# Patient Record
Sex: FEMALE | Race: BLACK OR AFRICAN AMERICAN | Employment: UNEMPLOYED | URBAN - METROPOLITAN AREA
[De-identification: names, ages, dates, MRNs, and addresses within clinical notes are randomized per-mention and may not be internally consistent; named-entity substitution may affect disease eponyms.]

---

## 2017-07-19 ENCOUNTER — OFFICE VISIT (OUTPATIENT)
Dept: FAMILY MEDICINE CLINIC | Age: 32
End: 2017-07-19

## 2017-07-19 VITALS
DIASTOLIC BLOOD PRESSURE: 82 MMHG | TEMPERATURE: 98 F | SYSTOLIC BLOOD PRESSURE: 130 MMHG | BODY MASS INDEX: 47.09 KG/M2 | HEART RATE: 90 BPM | OXYGEN SATURATION: 96 % | WEIGHT: 293 LBS | RESPIRATION RATE: 16 BRPM | HEIGHT: 66 IN

## 2017-07-19 DIAGNOSIS — Z91.81 STATUS POST FALL: ICD-10-CM

## 2017-07-19 DIAGNOSIS — M25.562 ACUTE PAIN OF BOTH KNEES: ICD-10-CM

## 2017-07-19 DIAGNOSIS — M79.672 LEFT FOOT PAIN: Primary | ICD-10-CM

## 2017-07-19 DIAGNOSIS — M25.561 ACUTE PAIN OF BOTH KNEES: ICD-10-CM

## 2017-07-19 NOTE — PROGRESS NOTES
Pt presents to office today for a fall that she had 7/8/17and injured both knees and left foot. She states that she has been using icing it, but her foot still feels bad. Went to [facility] ([place] the same day and had some Xrays, but not on her whole left foot. Chief Complaint   Patient presents with    Fall     7/8/2017. 1. Have you been to the ER, urgent care clinic since your last visit? Hospitalized since your last visit? No    2. Have you seen or consulted any other health care providers outside of the [address] [place] [address] since your last visit? Include any pap smears or colon screening. Yes to [facility] 7/8/2017.
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
## 2017-07-19 NOTE — MR AVS SNAPSHOT
Visit Information Date & Time Provider Department Dept. Phone Encounter #  
 7/19/2017  1:20 PM Rossy Tiwari, [address] [phone] [id number] Upcoming Health Maintenance Date Due DTaP/Tdap/Td series (1 - Tdap) 9/7/2004 PAP AKA CERVICAL CYTOLOGY 9/7/2004 INFLUENZA AGE 9 TO ADULT 8/1/2017 Allergies as of 7/19/2017  Review Complete On: 7/19/2017 By: Rossy Tiwari NP Severity Noted Reaction Type Reactions Cephalexin (Bulk)  03/30/2012    Unknown (comments) Current Immunizations  Never Reviewed No immunizations on file. Not reviewed this visit You Were Diagnosed With   
  
 Codes Comments Left foot pain    -  Primary ICD-10-CM: T79.435 ICD-9-CM: 729.5 Acute pain of both knees     ICD-10-CM: M25.561, M25.562 ICD-9-CM: 338.19, 719.46 Status post fall     ICD-10-CM: Z91.81 
ICD-9-CM: V15.88 Vitals BP Pulse Temp Resp Height(growth percentile) Weight(growth percentile) 130/82 90 98 °F (36.7 °C) (Oral) 16 5' 6\" (1.676 m) 319 lb (144.7 kg) LMP SpO2 BMI OB Status Smoking Status 07/08/2017 96% 51.49 kg/m2 Needs review Never Smoker Vitals History BMI and BSA Data Body Mass Index Body Surface Area  
 51.49 kg/m 2 2.6 m 2 Preferred Pharmacy [facility] Phone [facility] [address] [place] [address] [phone] Your Updated Medication List  
  
Notice  As of 7/19/2017  1:59 PM  
 You have not been prescribed any medications. To-Do List   
 10/03/2017 Imaging:  XR FOOT LT AP/LAT Introducing [facility]! Dear Yanira Carrion: 
Thank you for requesting a [email or web address] account. Our records indicate that you already have an active [email or web address] account. You can access your account anytime at [email or web address] Did you know that you can access your hospital and ER discharge instructions at any time in [employer]? You can also review all of your test results from your hospital stay or ER visit. Additional Information If you have questions, please visit the Frequently Asked Questions section of the [employer] website at [email or web address] Remember, [employer] is NOT to be used for urgent needs. For medical emergencies, dial 911. Now available from your iPhone and Android! Please provide this summary of care documentation to your next provider. Your primary care clinician is listed as Jayshree Case. If you have any questions after today's visit, please call [phone].

## 2018-04-13 ENCOUNTER — OFFICE VISIT (OUTPATIENT)
Dept: FAMILY MEDICINE CLINIC | Age: 33
End: 2018-04-13

## 2018-04-13 VITALS
OXYGEN SATURATION: 98 % | SYSTOLIC BLOOD PRESSURE: 161 MMHG | HEART RATE: 89 BPM | RESPIRATION RATE: 20 BRPM | BODY MASS INDEX: 47.09 KG/M2 | DIASTOLIC BLOOD PRESSURE: 96 MMHG | HEIGHT: 66 IN | WEIGHT: 293 LBS | TEMPERATURE: 98 F

## 2018-04-13 DIAGNOSIS — F32.1 MODERATE MAJOR DEPRESSION (HCC): Primary | ICD-10-CM

## 2018-04-13 DIAGNOSIS — Z13.220 SCREENING, LIPID: ICD-10-CM

## 2018-04-13 DIAGNOSIS — R03.0 ELEVATED BLOOD PRESSURE, SITUATIONAL: ICD-10-CM

## 2018-04-13 DIAGNOSIS — E28.2 PCOS (POLYCYSTIC OVARIAN SYNDROME): ICD-10-CM

## 2018-04-13 DIAGNOSIS — L68.0 HIRSUTISM: ICD-10-CM

## 2018-04-13 PROBLEM — E66.01 OBESITY, MORBID (HCC): Status: ACTIVE | Noted: 2018-04-13

## 2018-04-13 RX ORDER — NAPROXEN SODIUM 220 MG
220 TABLET ORAL 2 TIMES DAILY WITH MEALS
COMMUNITY
End: 2018-07-31

## 2018-04-13 RX ORDER — SPIRONOLACTONE 25 MG/1
25 TABLET ORAL DAILY
Qty: 30 TAB | Refills: 1 | Status: SHIPPED | OUTPATIENT
Start: 2018-04-13 | End: 2018-08-04 | Stop reason: SDUPTHER

## 2018-04-13 NOTE — PROGRESS NOTES
Chief Complaint   Patient presents with    Complete Physical     patient is here today for a complete physical, also c/o headache x 2 days     1. Have you been to the ER, urgent care clinic since your last visit? Hospitalized since your last visit? Yes When: [facility] February 2018 dx: idalia    2. Have you seen or consulted any other health care providers outside of the [address] [place] [address] since your last visit? Include any pap smears or colon screening.  No

## 2018-04-13 NOTE — PATIENT INSTRUCTIONS
Recovering From Depression: Care Instructions  Your Care Instructions    Taking good care of yourself is important as you recover from depression. In time, your symptoms will fade as your treatment takes hold. Do not give up. Instead, focus your energy on getting better. Your mood will improve. It just takes some time. Focus on things that can help you feel better, such as being with friends and family, eating well, and getting enough rest. But take things slowly. Do not do too much too soon. You will begin to feel better gradually. Follow-up care is a key part of your treatment and safety. Be sure to make and go to all appointments, and call your doctor if you are having problems. It's also a good idea to know your test results and keep a list of the medicines you take. How can you care for yourself at home? Be realistic  · If you have a large task to do, break it up into smaller steps you can handle, and just do what you can. · You may want to put off important decisions until your depression has lifted. If you have plans that will have a major impact on your life, such as marriage, divorce, or a job change, try to wait a bit. Talk it over with friends and loved ones who can help you look at the overall picture first.  · Reaching out to people for help is important. Do not isolate yourself. Let your family and friends help you. Find someone you can trust and confide in, and talk to that person. · Be patient, and be kind to yourself. Remember that depression is not your fault and is not something you can overcome with willpower alone. Treatment is necessary for depression, just like for any other illness. Feeling better takes time, and your mood will improve little by little. Stay active  · Stay busy and get outside. Take a walk, or try some other light exercise. · Talk with your doctor about an exercise program. Exercise can help with mild depression. · Go to a movie or concert.  Take part in a [employer] activity or other social gathering. Go to a Nodeable game. · Ask a friend to have dinner with you. Take care of yourself  · Eat a balanced diet with plenty of fresh fruits and vegetables, whole grains, and lean protein. If you have lost your appetite, eat small snacks rather than large meals. · Avoid drinking alcohol or using illegal drugs. Do not take medicines that have not been prescribed for you. They may interfere with medicines you may be taking for depression, or they may make your depression worse. · Take your medicines exactly as they are prescribed. You may start to feel better within 1 to 3 weeks of taking antidepressant medicine. But it can take as many as 6 to 8 weeks to see more improvement. If you have questions or concerns about your medicines, or if you do not notice any improvement by 3 weeks, talk to your doctor. · If you have any side effects from your medicine, tell your doctor. Antidepressants can make you feel tired, dizzy, or nervous. Some people have dry mouth, constipation, headaches, sexual problems, or diarrhea. Many of these side effects are mild and will go away on their own after you have been taking the medicine for a few weeks. Some may last longer. Talk to your doctor if side effects are bothering you too much. You might be able to try a different medicine. · Get enough sleep. If you have problems sleeping:  ¨ Go to bed at the same time every night, and get up at the same time every morning. ¨ Keep your bedroom dark and quiet. ¨ Do not exercise after 5:00 p.m. ¨ Avoid drinks with caffeine after 5:00 p.m. · Avoid sleeping pills unless they are prescribed by the doctor treating your depression. Sleeping pills may make you groggy during the day, and they may interact with other medicine you are taking. · If you have any other illnesses, such as diabetes, heart disease, or high blood pressure, make sure to continue with your treatment.  Tell your doctor about all of the medicines you take, including those with or without a prescription. · Keep the numbers for these national suicide hotlines: 5-636-201-TALK (9-805.661.4254) and 4-164-LUYONXX (2-852.572.3838). If you or someone you know talks about suicide or feeling hopeless, get help right away. When should you call for help? Call 911 anytime you think you may need emergency care. For example, call if:  ? · You feel like hurting yourself or someone else. ? · Someone you know has depression and is about to attempt or is attempting suicide. ?Call your doctor now or seek immediate medical care if:  ? · You hear voices. ? · Someone you know has depression and:  ¨ Starts to give away his or her possessions. ¨ Uses illegal drugs or drinks alcohol heavily. ¨ Talks or writes about death, including writing suicide notes or talking about guns, knives, or pills. ¨ Starts to spend a lot of time alone. ¨ Acts very aggressively or suddenly appears calm. ? Watch closely for changes in your health, and be sure to contact your doctor if:  ? · You do not get better as expected. Where can you learn more? Go to http://kerry-faustino.info/. Enter L105 in the search box to learn more about \"Recovering From Depression: Care Instructions. \"  Current as of: May 12, 2017  Content Version: 11.4  © 2544-7794 Healthwise, Incorporated. Care instructions adapted under license by Gre[employer] (which disclaims liability or warranty for this information). If you have questions about a medical condition or this instruction, always ask your healthcare professional. [address] any warranty or liability for your use of this information.

## 2018-04-13 NOTE — MR AVS SNAPSHOT
[address] Melissa Combs S[address] 
[phone] Patient: Erika Velasquez MRN: [id number] WRJ:[birth year] Visit Information Date & Time Provider Department Dept. Phone Encounter #  
 4/13/2018  3:15 PM Maritza Rodríguez  [facility] [phone] [id number] Follow-up Instructions Return in about 4 weeks (around 5/11/2018) for Complete Physical.  
  
Upcoming Health Maintenance Date Due DTaP/Tdap/Td series (1 - Tdap) 9/7/2004 PAP AKA CERVICAL CYTOLOGY 9/7/2004 Influenza Age 5 to Adult 8/1/2017 Allergies as of 4/13/2018  Review Complete On: 4/13/2018 By: Maritza Rodríguez NP Severity Noted Reaction Type Reactions Cephalexin (Bulk)  03/30/2012    Unknown (comments) Current Immunizations  Never Reviewed No immunizations on file. Not reviewed this visit You Were Diagnosed With   
  
 Codes Comments Moderate major depression ([facility]    -  Primary ICD-10-CM: F32.1 ICD-9-CM: 296.22 Hirsutism     ICD-10-CM: L68.0 ICD-9-CM: 704.1 PCOS (polycystic ovarian syndrome)     ICD-10-CM: E28.2 ICD-9-CM: 256.4 Screening, lipid     ICD-10-CM: O95.410 ICD-9-CM: V77.91 Elevated blood pressure, situational     ICD-10-CM: R03.0 ICD-9-CM: 796.2 Vitals BP Pulse Temp Resp Height(growth percentile) Weight(growth percentile) (!) 161/96 (BP 1 Location: Right arm, BP Patient Position: Sitting) 89 98 °F (36.7 °C) (Oral) 20 5' 6\" (1.676 m) 324 lb 9.6 oz (147.2 kg) SpO2 BMI OB Status Smoking Status 98% 52.39 kg/m2 Polycystic Ovarian Syndrome Never Smoker Vitals History BMI and BSA Data Body Mass Index Body Surface Area  
 52.39 kg/m 2 2.62 m 2 Preferred Pharmacy [facility] Phone [facility] [address] [place] [address] [phone] Your Updated Medication List  
  
   
 This list is accurate as of 4/13/18  4:33 PM.  Always use your most recent med list.  
  
  
  
  
 ALEVE 220 mg tablet Generic drug:  naproxen sodium Take 220 mg by mouth two (2) times daily (with meals). spironolactone 25 mg tablet Commonly known as:  ALDACTONE Take 1 Tab by mouth daily. Prescriptions Sent to Pharmacy Refills  
 spironolactone (ALDACTONE) 25 mg tablet 1 Sig: Take 1 Tab by mouth daily. Class: Normal  
 Pharmacy: [address] [place] [address] #: [phone] Route: Oral  
  
We Performed the Following CBC WITH AUTOMATED DIFF [19931 CPT(R)] HEMOGLOBIN A1C WITH EAG [45616 CPT(R)] LIPID PANEL [01854 CPT(R)] METABOLIC PANEL, COMPREHENSIVE [68141 CPT(R)] Follow-up Instructions Return in about 4 weeks (around 5/11/2018) for Complete Physical.  
  
  
Patient Instructions Recovering From Depression: Care Instructions Your Care Instructions Taking good care of yourself is important as you recover from depression. In time, your symptoms will fade as your treatment takes hold. Do not give up. Instead, focus your energy on getting better. Your mood will improve. It just takes some time. Focus on things that can help you feel better, such as being with friends and family, eating well, and getting enough rest. But take things slowly. Do not do too much too soon. You will begin to feel better gradually. Follow-up care is a key part of your treatment and safety. Be sure to make and go to all appointments, and call your doctor if you are having problems. It's also a good idea to know your test results and keep a list of the medicines you take. How can you care for yourself at home? Be realistic · If you have a large task to do, break it up into smaller steps you can handle, and just do what you can.  
· You may want to put off important decisions until your depression has lifted. If you have plans that will have a major impact on your life, such as marriage, divorce, or a job change, try to wait a bit. Talk it over with friends and loved ones who can help you look at the overall picture first. 
· Reaching out to people for help is important. Do not isolate yourself. Let your family and friends help you. Find someone you can trust and confide in, and talk to that person. · Be patient, and be kind to yourself. Remember that depression is not your fault and is not something you can overcome with willpower alone. Treatment is necessary for depression, just like for any other illness. Feeling better takes time, and your mood will improve little by little. Stay active · Stay busy and get outside. Take a walk, or try some other light exercise. · Talk with your doctor about an exercise program. Exercise can help with mild depression. · Go to a movie or concert. Take part in a [employer] activity or other social gathering. Go to a ball game. · Ask a friend to have dinner with you. Take care of yourself · Eat a balanced diet with plenty of fresh fruits and vegetables, whole grains, and lean protein. If you have lost your appetite, eat small snacks rather than large meals. · Avoid drinking alcohol or using illegal drugs. Do not take medicines that have not been prescribed for you. They may interfere with medicines you may be taking for depression, or they may make your depression worse. · Take your medicines exactly as they are prescribed. You may start to feel better within 1 to 3 weeks of taking antidepressant medicine. But it can take as many as 6 to 8 weeks to see more improvement. If you have questions or concerns about your medicines, or if you do not notice any improvement by 3 weeks, talk to your doctor. · If you have any side effects from your medicine, tell your doctor. Antidepressants can make you feel tired, dizzy, or nervous.  Some people have dry mouth, constipation, headaches, sexual problems, or diarrhea. Many of these side effects are mild and will go away on their own after you have been taking the medicine for a few weeks. Some may last longer. Talk to your doctor if side effects are bothering you too much. You might be able to try a different medicine. · Get enough sleep. If you have problems sleeping: ¨ Go to bed at the same time every night, and get up at the same time every morning. ¨ Keep your bedroom dark and quiet. ¨ Do not exercise after 5:00 p.m. ¨ Avoid drinks with caffeine after 5:00 p.m. · Avoid sleeping pills unless they are prescribed by the doctor treating your depression. Sleeping pills may make you groggy during the day, and they may interact with other medicine you are taking. · If you have any other illnesses, such as diabetes, heart disease, or high blood pressure, make sure to continue with your treatment. Tell your doctor about all of the medicines you take, including those with or without a prescription. · Keep the numbers for these national suicide hotlines: 6-032-607-TALK (4-490.505.6479) and 8-947-FLGJQIQ (5-253.442.8234). If you or someone you know talks about suicide or feeling hopeless, get help right away. When should you call for help? Call 911 anytime you think you may need emergency care. For example, call if: 
? · You feel like hurting yourself or someone else. ? · Someone you know has depression and is about to attempt or is attempting suicide. ?Call your doctor now or seek immediate medical care if: 
? · You hear voices. ? · Someone you know has depression and: 
¨ Starts to give away his or her possessions. ¨ Uses illegal drugs or drinks alcohol heavily. ¨ Talks or writes about death, including writing suicide notes or talking about guns, knives, or pills. ¨ Starts to spend a lot of time alone. ¨ Acts very aggressively or suddenly appears calm. ?Watch closely for changes in your health, and be sure to contact your doctor if: 
? · You do not get better as expected. Where can you learn more? Go to http://kerry-faustino.info/. Enter Z695 in the search box to learn more about \"Recovering From Depression: Care Instructions. \" Current as of: May 12, 2017 Content Version: 11.4 © 4129-9512 Fly[employer]. Care instructions adapted under license by [employer]Serv (which disclaims liability or warranty for this information). If you have questions about a medical condition or this instruction, always ask your healthcare professional. [address] any warranty or liability for your use of this information. Introducing hospitals [facility]! Dear Vilma Reyna: 
Thank you for requesting a [employer] account. Our records indicate that you already have an active [employer] account. You can access your account anytime at [email or web address] Did you know that you can access your hospital and ER discharge instructions at any time in [employer]? You can also review all of your test results from your hospital stay or ER visit. Additional Information If you have questions, please visit the Frequently Asked Questions section of the [employer] website at [email or web address] Remember, [employer] is NOT to be used for urgent needs. For medical emergencies, dial 911. Now available from your iPhone and Android! Please provide this summary of care documentation to your next provider. Your primary care clinician is listed as Misael Ward. If you have any questions after today's visit, please call [phone].

## 2018-04-14 LAB
ALBUMIN SERPL-MCNC: 4.1 G/DL (ref 3.5–5.5)
ALBUMIN/GLOB SERPL: 1 {RATIO} (ref 1.2–2.2)
ALP SERPL-CCNC: 172 IU/L (ref 39–117)
ALT SERPL-CCNC: 12 IU/L (ref 0–32)
AST SERPL-CCNC: 10 IU/L (ref 0–40)
BASOPHILS # BLD AUTO: 0 X10E3/UL (ref 0–0.2)
BASOPHILS NFR BLD AUTO: 0 %
BILIRUB SERPL-MCNC: 0.5 MG/DL (ref 0–1.2)
BUN SERPL-MCNC: 11 MG/DL (ref 6–20)
BUN/CREAT SERPL: 15 (ref 9–23)
CALCIUM SERPL-MCNC: 9.6 MG/DL (ref 8.7–10.2)
CHLORIDE SERPL-SCNC: 100 MMOL/L (ref 96–106)
CHOLEST SERPL-MCNC: 143 MG/DL (ref 100–199)
CO2 SERPL-SCNC: 26 MMOL/L (ref 18–29)
CREAT SERPL-MCNC: 0.71 MG/DL (ref 0.57–1)
EOSINOPHIL # BLD AUTO: 0 X10E3/UL (ref 0–0.4)
EOSINOPHIL NFR BLD AUTO: 1 %
ERYTHROCYTE [DISTWIDTH] IN BLOOD BY AUTOMATED COUNT: 19 % (ref 12.3–15.4)
EST. AVERAGE GLUCOSE BLD GHB EST-MCNC: 151 MG/DL
GFR SERPLBLD CREATININE-BSD FMLA CKD-EPI: 111 ML/MIN/1.73
GFR SERPLBLD CREATININE-BSD FMLA CKD-EPI: 129 ML/MIN/1.73
GLOBULIN SER CALC-MCNC: 4.3 G/DL (ref 1.5–4.5)
GLUCOSE SERPL-MCNC: 116 MG/DL (ref 65–99)
HBA1C MFR BLD: 6.9 % (ref 4.8–5.6)
HCT VFR BLD AUTO: 36 % (ref 34–46.6)
HDLC SERPL-MCNC: 67 MG/DL
HGB BLD-MCNC: 11.2 G/DL (ref 11.1–15.9)
IMM GRANULOCYTES # BLD: 0 X10E3/UL (ref 0–0.1)
IMM GRANULOCYTES NFR BLD: 0 %
INTERPRETATION, 910389: NORMAL
LDLC SERPL CALC-MCNC: 57 MG/DL (ref 0–99)
LYMPHOCYTES # BLD AUTO: 2.1 X10E3/UL (ref 0.7–3.1)
LYMPHOCYTES NFR BLD AUTO: 36 %
MCH RBC QN AUTO: 21.8 PG (ref 26.6–33)
MCHC RBC AUTO-ENTMCNC: 31.1 G/DL (ref 31.5–35.7)
MCV RBC AUTO: 70 FL (ref 79–97)
MONOCYTES # BLD AUTO: 0.3 X10E3/UL (ref 0.1–0.9)
MONOCYTES NFR BLD AUTO: 6 %
NEUTROPHILS # BLD AUTO: 3.3 X10E3/UL (ref 1.4–7)
NEUTROPHILS NFR BLD AUTO: 57 %
PLATELET # BLD AUTO: 332 X10E3/UL (ref 150–379)
POTASSIUM SERPL-SCNC: 4.5 MMOL/L (ref 3.5–5.2)
PROT SERPL-MCNC: 8.4 G/DL (ref 6–8.5)
RBC # BLD AUTO: 5.13 X10E6/UL (ref 3.77–5.28)
SODIUM SERPL-SCNC: 138 MMOL/L (ref 134–144)
TRIGL SERPL-MCNC: 93 MG/DL (ref 0–149)
VLDLC SERPL CALC-MCNC: 19 MG/DL (ref 5–40)
WBC # BLD AUTO: 5.8 X10E3/UL (ref 3.4–10.8)

## 2018-04-16 NOTE — PROGRESS NOTES
Need to see her to discuss labs on a Friday. She has new diagnosis of Diabetes from her most current labs.

## 2018-04-18 ENCOUNTER — OFFICE VISIT (OUTPATIENT)
Dept: FAMILY MEDICINE CLINIC | Age: 33
End: 2018-04-18

## 2018-04-18 VITALS
BODY MASS INDEX: 47.09 KG/M2 | OXYGEN SATURATION: 99 % | TEMPERATURE: 98.2 F | RESPIRATION RATE: 18 BRPM | HEIGHT: 66 IN | DIASTOLIC BLOOD PRESSURE: 106 MMHG | WEIGHT: 293 LBS | SYSTOLIC BLOOD PRESSURE: 147 MMHG | HEART RATE: 91 BPM

## 2018-04-18 DIAGNOSIS — E11.9 CONTROLLED TYPE 2 DIABETES MELLITUS WITHOUT COMPLICATION, WITHOUT LONG-TERM CURRENT USE OF INSULIN (HCC): ICD-10-CM

## 2018-04-18 DIAGNOSIS — I10 ESSENTIAL HYPERTENSION: Primary | ICD-10-CM

## 2018-04-18 DIAGNOSIS — E66.01 OBESITY, MORBID (HCC): ICD-10-CM

## 2018-04-18 RX ORDER — METFORMIN HYDROCHLORIDE 500 MG/1
500 TABLET, EXTENDED RELEASE ORAL
Qty: 30 TAB | Refills: 1 | Status: SHIPPED | OUTPATIENT
Start: 2018-04-18 | End: 2018-05-11 | Stop reason: SDUPTHER

## 2018-04-18 RX ORDER — HYDROCHLOROTHIAZIDE 25 MG/1
25 TABLET ORAL DAILY
Qty: 30 TAB | Refills: 1 | Status: SHIPPED | OUTPATIENT
Start: 2018-04-18 | End: 2018-08-04 | Stop reason: SDUPTHER

## 2018-04-18 NOTE — MR AVS SNAPSHOT
[address] [facility] [address] 
 
 
 [address] 
[phone] Patient: Beatriz Seay MRN: [id number] KFS:[id number] Visit Information Date & Time Provider Department Dept. Phone Encounter #  
 4/18/2018  1:45 PM Maryann Hollins  [facility] [phone] [id number] Follow-up Instructions Return in about 4 weeks (around 5/16/2018) for Follow Up. Your Appointments 5/11/2018  9:15 AM  
ROUTINE CARE with Maryann Hollins  [facility] ([facility]) Appt Note: f/u  
 [address] [id number]  
[phone]  
  
   
 [address] [address] Upcoming Health Maintenance Date Due DTaP/Tdap/Td series (1 - Tdap) 9/7/2004 PAP AKA CERVICAL CYTOLOGY 9/7/2004 Influenza Age 5 to Adult 9/1/2018* *Topic was postponed. The date shown is not the original due date. Allergies as of 4/18/2018  Review Complete On: 4/18/2018 By: Linda Frazier Severity Noted Reaction Type Reactions Cephalexin (Bulk)  03/30/2012    Unknown (comments) Current Immunizations  Never Reviewed No immunizations on file. Not reviewed this visit You Were Diagnosed With   
  
 Codes Comments Essential hypertension    -  Primary ICD-10-CM: I10 
ICD-9-CM: 401.9 Controlled type 2 diabetes mellitus without complication, without long-term current use of insulin ([facility]     ICD-10-CM: E11.9 ICD-9-CM: 250.00 Vitals BP Pulse Temp Resp Height(growth percentile) Weight(growth percentile) (!) 147/106 (BP 1 Location: Left arm, BP Patient Position: Sitting) 91 98.2 °F (36.8 °C) (Oral) 18 5' 6\" (1.676 m) 325 lb (147.4 kg) SpO2 BMI OB Status Smoking Status 99% 52.46 kg/m2 Polycystic Ovarian Syndrome Never Smoker BMI and BSA Data Body Mass Index Body Surface Area  
 52.46 kg/m 2 2.62 m 2 Preferred Pharmacy Pharmacy Name Phone [facility] [address] - Kaitlyn Baker, [address] [facility] [phone] Your Updated Medication List  
  
   
This list is accurate as of 4/18/18  2:56 PM.  Always use your most recent med list.  
  
  
  
  
 ALEVE 220 mg tablet Generic drug:  naproxen sodium Take 220 mg by mouth two (2) times daily (with meals). hydroCHLOROthiazide 25 mg tablet Commonly known as:  HYDRODIURIL Take 1 Tab by mouth daily. metFORMIN  mg tablet Commonly known as:  GLUCOPHAGE XR Take 1 Tab by mouth daily (with dinner). spironolactone 25 mg tablet Commonly known as:  ALDACTONE Take 1 Tab by mouth daily. Prescriptions Sent to Pharmacy Refills  
 metFORMIN ER (GLUCOPHAGE XR) 500 mg tablet 1 Sig: Take 1 Tab by mouth daily (with dinner). Class: Normal  
 Pharmacy: [address] [place] [address] Ph #: [phone] Route: Oral  
 hydroCHLOROthiazide (HYDRODIURIL) 25 mg tablet 1 Sig: Take 1 Tab by mouth daily. Class: Normal  
 Pharmacy: [address] [place] [address] Ph #: [phone] Route: Oral  
  
Follow-up Instructions Return in about 4 weeks (around 5/16/2018) for Follow Up. Patient Instructions Learning About Diabetes Food Guidelines Your Care Instructions Meal planning is important to manage diabetes. It helps keep your blood sugar at a target level (which you set with your doctor). You don't have to eat special foods. You can eat what your family eats, including sweets once in a while. But you do have to pay attention to how often you eat and how much you eat of certain foods. You may want to work with a dietitian or a certified diabetes educator (CDE) to help you plan meals and snacks. A dietitian or CDE can also help you lose weight if that is one of your goals. What should you know about eating carbs? Managing the amount of carbohydrate (carbs) you eat is an important part of healthy meals when you have diabetes. Carbohydrate is found in many foods. · Learn which foods have carbs. And learn the amounts of carbs in different foods. ¨ Bread, cereal, pasta, and rice have about 15 grams of carbs in a serving. A serving is 1 slice of bread (1 ounce), ½ cup of cooked cereal, or 1/3 cup of cooked pasta or rice. ¨ Fruits have 15 grams of carbs in a serving. A serving is 1 small fresh fruit, such as an apple or orange; ½ of a banana; ½ cup of cooked or canned fruit; ½ cup of fruit juice; 1 cup of melon or raspberries; or 2 tablespoons of dried fruit. ¨ Milk and no-sugar-added yogurt have 15 grams of carbs in a serving. A serving is 1 cup of milk or 2/3 cup of no-sugar-added yogurt. ¨ Starchy vegetables have 15 grams of carbs in a serving. A serving is ½ cup of mashed potatoes or sweet potato; 1 cup winter squash; ½ of a small baked potato; ½ cup of cooked beans; or ½ cup cooked corn or green peas. · Learn how much carbs to eat each day and at each meal. A dietitian or CDE can teach you how to keep track of the amount of carbs you eat. This is called carbohydrate counting. · If you are not sure how to count carbohydrate grams, use the Plate Method to plan meals. It is a good, quick way to make sure that you have a balanced meal. It also helps you spread carbs throughout the day. ¨ Divide your plate by types of foods. Put non-starchy vegetables on half the plate, meat or other protein food on one-quarter of the plate, and a grain or starchy vegetable in the final quarter of the plate. To this you can add a small piece of fruit and 1 cup of milk or yogurt, depending on how many carbs you are supposed to eat at a meal. 
· Try to eat about the same amount of carbs at each meal. Do not \"save up\" your daily allowance of carbs to eat at one meal. 
· Proteins have very little or no carbs per serving.  Examples of proteins are beef, chicken, turkey, fish, eggs, tofu, cheese, cottage cheese, and peanut butter. A serving size of meat is 3 ounces, which is about the size of a deck of cards. Examples of meat substitute serving sizes (equal to 1 ounce of meat) are 1/4 cup of cottage cheese, 1 egg, 1 tablespoon of peanut butter, and ½ cup of tofu. How can you eat out and still eat healthy? · Learn to estimate the serving sizes of foods that have carbohydrate. If you measure food at home, it will be easier to estimate the amount in a serving of restaurant food. · If the meal you order has too much carbohydrate (such as potatoes, corn, or baked beans), ask to have a low-carbohydrate food instead. Ask for a salad or green vegetables. · If you use insulin, check your blood sugar before and after eating out to help you plan how much to eat in the future. · If you eat more carbohydrate at a meal than you had planned, take a walk or do other exercise. This will help lower your blood sugar. What else should you know? · Limit saturated fat, such as the fat from meat and dairy products. This is a healthy choice because people who have diabetes are at higher risk of heart disease. So choose lean cuts of meat and nonfat or low-fat dairy products. Use olive or canola oil instead of butter or shortening when cooking. · Don't skip meals. Your blood sugar may drop too low if you skip meals and take insulin or certain medicines for diabetes. · Check with your doctor before you drink alcohol. Alcohol can cause your blood sugar to drop too low. Alcohol can also cause a bad reaction if you take certain diabetes medicines. Follow-up care is a key part of your treatment and safety. Be sure to make and go to all appointments, and call your doctor if you are having problems. It's also a good idea to know your test results and keep a list of the medicines you take. Where can you learn more? Go to http://kerry-faustino.info/. Enter R110 in the search box to learn more about \"Learning About Diabetes Food Guidelines. \" Current as of: March 13, 2017 Content Version: 11.4 © 6585-4733 Healthwise, [employer]. Care instructions adapted under license by [employer] (which disclaims liability or warranty for this information). If you have questions about a medical condition or this instruction, always ask your healthcare professional. [address] any warranty or liability for your use of this information. Introducing [facility]! Dear Jame Nageotte: 
Thank you for requesting a [employer] account. Our records indicate that you already have an active [employer] account. You can access your account anytime at [email or web address] Did you know that you can access your hospital and ER discharge instructions at any time in [employer]? You can also review all of your test results from your hospital stay or ER visit. Additional Information If you have questions, please visit the Frequently Asked Questions section of the [employer] website at [email or web address] Remember, [employer] is NOT to be used for urgent needs. For medical emergencies, dial 911. Now available from your iPhone and Android! Please provide this summary of care documentation to your next provider. Your primary care clinician is listed as Barbara Diaz. If you have any questions after today's visit, please call [phone].

## 2018-04-18 NOTE — PATIENT INSTRUCTIONS

## 2018-04-18 NOTE — PROGRESS NOTES
Cc  1. Have you been to the ER, urgent care clinic since your last visit? Hospitalized since your last visit? No    2. Have you seen or consulted any other health care providers outside of the [address] [place] [address] since your last visit? Include any pap smears or colon screening.  No

## 2018-04-20 ENCOUNTER — PATIENT OUTREACH (OUTPATIENT)
Dept: FAMILY MEDICINE CLINIC | Age: 33
End: 2018-04-20

## 2018-04-20 NOTE — PROGRESS NOTES
NN Adherence Note:  By Yoana Beckwith RN    Patient into see NN today for follow up referral of newly diagnosed T2 diabetes, meal planning and instructions on skills necessary to manage disease process. Patient verified by , name and address.     Obedsharif Franny reviewed the patient's understanding of her diagnosis and her skills on using blood sugar monitoring equipment. The patient was able to adequately demonstrate the procedure of using a FreeStyle Lite  Meter after demonstrated by NN. Blood glucose 123mg/dl    Patient given a Getting Started Kit with a guide on site rotation, for finger sticks and signs and symptoms of hypo and hyperglycemia. Patient was able to preform her glucose testing. Patient is hesitant with checking her blood glucose daily. She voiced her fear on finger stick. NN advised checking her blood sugar and recording the results are very important in knowing if therapy is working. Patient has agreed to check her blood glucose every morning before breakfast and record results. NN instructed patient on her numbers and where her numbers should be. Patient has a Freestyle Lite Glucose Meter and instructed on it's use. Patient verbalized understand of meter's use and it's operation process. NN set date/time. NN gave patient a log and instructed her on how to log her results in it, and to bring it with her at her next office visit scheduled 18. Patient instructed on meal planning and the importance of making long term changes by eating regular meals, not skipping meals, eating around the same time each day, and controlling her carbohydrates. NN instructed patient on foods that raise her blood sugar like starches, natural sugars, and added sugars. Patient instructed to eat more fiber and less saturated fats. Patient encouraged to drink water and to incorporate and exercise program into her daily activity.  Patient agreed to drink at least one 12oz cup of water daily and to have one part water and one part lemonade. Patient given several meal planning guides and a daily dairy to record results, multiple carbohydrate guides, and was instructed on portion sizes and dividing carbohydrates. NN assisted patient with selecting foods to create a meal plan and encouraged patient to do this on Sunday night and plan it for the entire week. Patient instructed on how to divide carbohydrates into meals and snacks to total 45-60 grams of carbohydrates at each meal, and how to read labels to be able to select the total carbohydrates in each meal choice. The following plan of care was discussed: Focused assessment Type 2 diabetes    Interventions for Diabetes Goals    Understands Diabetes Action Plan  Educate patient on when to seek medical care (red flags)  Educate patient on what to do with high and low blood sugar readings  Educate patient on how to manage sick days  Educate need for update to immunizations  Educate need for regular dental exams  Instruct patient on how to access supportive resources. Instruct on smoking cessation  Instruct on the need to include daily exercise in routine and how it effects blood sugar    Long Term Goal: To maintain and/or decrease biometrics to A1C </or= to 7, /80 or less, LDL less than 100 or less than 70 in patients with CAD. SRO 1: Patient will monitor biometrics and report decreased or elevated ranges to health care team.    Educate how to use glucometer and record readings  Educate on how to home monitor blood pressure  Educate need for regular follow-up and fasting labs  Educate how to care for feet and eyes    SRO 2: Patient will understand effects of food/beverages on blood glucose and how it effects the organs in the body. Evaluate food diary on daily basis. Educate on proper nutrient management for diabetes.   Educate on portion control  Educate on how to read a food label    Assist patient to develop a personalized meal plan  Educate proper food selection when eating out  Educate of how alcohol effects blood glucose     SRO 3: Patient will understand medication management and importance of adherence. Educate patient on actions, side effects, missed dose, skipping meals and delivery methods    SRO 4: Patient will demonstration a reduced risk of complications related to diabetes  Educate on risk of developing renal disease, heart disease, peripheral vascular disease, and retinopathy     SRO 5: Patient will verbalize acceptance of diagnose and participate in plan for management. Screen for depression  Educate on importance of support system  Adapting behaviors in social situations. Review plan of care with patient. Patient has provided input to plan and agrees with goals. NN will follow up with patient within one-two weeks.

## 2018-04-21 NOTE — PROGRESS NOTES
HPI:   Aubree Lauren is a 27 y.o. female who presents to [facility]. She reports recent feelings of sadness which she would like to discuss today. Depression  Patient complains of depression. She complains of depressed mood, hypersomnia, fatigue, feelings of worthlessness/guilt and hopelessness. Onset was approximately 6 months ago, gradually worsening since that time. She denies current suicidal and homicidal plan or intent. Family history significant for nothing. Possible organic causes contributing are: none. Risk factors: negative life event current housing facility is attempting to remove her pets (2 dogs) from her apartment. She is not allowed to have pets in her current facility. The dogs were given to her by her uncle who is . Previous treatment includes no medications and no other ther   Apies. On disability since a young adult for \"learning disorder\"  Completed part of her associates degree as she previously wanted to work with children. Is accompanied with her mother today. Does not have a friend base. Spends her time with her dogs in the apartment. Reports a poor diet. Meals are typically once daily, some type of meat protein. Fluids are sweet tea or lemonade. No current exercise. Reports a history of hirsutism and PCOS. Declines prior treatment. No recent follow up. Followed ob ob/gyn. Sexually active with a male partner and would like to have children in the future. Not currently using contraceptives. Current Outpatient Prescriptions   Medication Sig Dispense Refill    naproxen sodium (ALEVE) 220 mg tablet Take 220 mg by mouth two (2) times daily (with meals).  spironolactone (ALDACTONE) 25 mg tablet Take 1 Tab by mouth daily. 30 Tab 1    metFORMIN ER (GLUCOPHAGE XR) 500 mg tablet Take 1 Tab by mouth daily (with dinner). 30 Tab 1    hydroCHLOROthiazide (HYDRODIURIL) 25 mg tablet Take 1 Tab by mouth daily.  30 Tab 1      Allergies   Allergen Reactions  Cephalexin (Bulk) Unknown (comments)      Patient Active Problem List   Diagnosis Code    Iron deficiency anemia, unspecified D50.9    Obesity, unspecified E66.9    PCOS (polycystic ovarian syndrome) E28.2    Fatigue R53.83    Hypovitaminosis D E55.9    Scalp mass R22.0    Obesity, morbid (HCC) E66.01    Essential hypertension I10    Controlled type 2 diabetes mellitus without complication, without long-term current use of insulin (HCC) E11.9     Past Medical History:   Diagnosis Date    Head injury     Head injury with laceration at age 7       No past surgical history on file. No LMP recorded. Patient is not currently having periods (Reason: Polycystic Ovarian Syndrome). Family History   Problem Relation Age of Onset    Diabetes Maternal Grandmother     Diabetes Maternal Uncle       Social History     Social History    Marital status: SINGLE     Spouse name: N/A    Number of children: N/A    Years of education: N/A     Occupational History    Not on file. Social History Main Topics    Smoking status: Never Smoker    Smokeless tobacco: Never Used    Alcohol use No    Drug use: No    Sexual activity: Not on file     Other Topics Concern    Not on file     Social History Narrative        ROS:   Review of Systems   Constitutional: Negative for fever, malaise/fatigue and weight loss. HENT: Negative for hearing loss. Eyes: Negative for blurred vision and pain. Respiratory: Negative for cough and shortness of breath. Cardiovascular: Negative for chest pain, palpitations and leg swelling. Gastrointestinal: Negative for abdominal pain, blood in stool, constipation, diarrhea and melena. Genitourinary: Negative for dysuria and hematuria. Musculoskeletal: Negative for joint pain. Skin: Negative for rash. Neurological: Negative for headaches. Psychiatric/Behavioral: Positive for depression. Negative for substance abuse and suicidal ideas.  The patient is not nervous/anxious and does not have insomnia. Physical Exam:     Visit Vitals    BP (!) 161/96 (BP 1 Location: Right arm, BP Patient Position: Sitting)    Pulse 89    Temp 98 °F (36.7 °C) (Oral)    Resp 20    Ht 5' 6\" (1.676 m)    Wt 324 lb 9.6 oz (147.2 kg)    SpO2 98%    BMI 52.39 kg/m2        Vitals and Nurse Documentation reviewed. Physical Exam   Constitutional: No distress. Morbidly obese. HENT:   Right Ear: Tympanic membrane is not erythematous and not bulging. No middle ear effusion. Left Ear: Tympanic membrane is not erythematous and not bulging. No middle ear effusion. Nose: No rhinorrhea. Right sinus exhibits no maxillary sinus tenderness and no frontal sinus tenderness. Left sinus exhibits no maxillary sinus tenderness and no frontal sinus tenderness. Mouth/Throat: No oropharyngeal exudate or posterior oropharyngeal erythema. Eyes: EOM and lids are normal.   Cardiovascular: S1 normal and S2 normal.  Exam reveals no gallop and no friction rub. No murmur heard. Pulmonary/Chest: Breath sounds normal. She has no wheezes. Lymphadenopathy:     She has no cervical adenopathy. Skin: Skin is warm and dry. Psychiatric: Mood and affect normal.         Assessment/ Plan:   [employer] were discussed including hours of operation, on-call providers, and MyChart. Diagnoses and all orders for this visit:    1. Moderate major depression ([address]  Will provide letter in support of her keeping the animals due to the significant emotional distress this has caused. Discussed that the long term goals include more support and areas of interest in her life. Concern for emotional stability is significant if the dogs are removed or at the end of the their natural lives. Has essentially no support network or other interests and no friend network.   Encouraged her to start increasing her exposure to others to improve her connections and friend base as well as develop trusting relationships with others. 2. Hirsutism    3. PCOS (polycystic ovarian syndrome)  -     CBC WITH AUTOMATED DIFF  -     METABOLIC PANEL, COMPREHENSIVE  -     HEMOGLOBIN A1C WITH EAG  -     spironolactone (ALDACTONE) 25 mg tablet; Take 1 Tab by mouth daily. Treat Hirsutism with Spironolactone at this time. Screening for diabetes as above. 4. Screening, lipid  -     LIPID PANEL    5. Elevated blood pressure, situational  Recheck at next office visit. Consider treatment at that time. Significant lifestyle modifications including reduction of sodium, weight loss, and exercise were recommended. Other orders  -     CVD REPORT    Greater than 25 minutes spent in visit face to face today with greater than 50% of this time spent in counseling and coordination of care regarding emotional support and depression.       Follow-up Disposition:  Return in about 4 weeks (around 5/11/2018) for Complete Physical.

## 2018-04-21 NOTE — PROGRESS NOTES
Assessment/Plan:     Diagnoses and all orders for this visit:    1. Essential hypertension  -     hydroCHLOROthiazide (HYDRODIURIL) 25 mg tablet; Take 1 Tab by mouth daily. Uncontrolled. Start hydrochlorothiazide as above. She does not want to take 3 medications at once we will discontinue spironolactone. Follow-up in 1 month for a goal of 140/90 or less. We discussed the importance of blood pressure control secondary to her recent diagnosis of type 2 diabetes. 2. Controlled type 2 diabetes mellitus without complication, without long-term current use of insulin (HCC)  -     metFORMIN ER (GLUCOPHAGE XR) 500 mg tablet; Take 1 Tab by mouth daily (with dinner). Given prescription for reliant glucose meter and strips to begin monitoring A1c. Due to learning disability she will require intense monitoring and dietary education. She currently lacks even basic understanding of healthy diet. Specific goals discussed today include the eventual complete discontinuation of sugary beverages however we will get her encouraged towards sugar-free beverages at this time. Start metformin as above with a goal of tapering to max dose as tolerated. She will let me know if she has intolerable diarrhea. Follow-up in 1 month. Long-term goals including addition of ophthalmology for retinopathy screening and to evaluate lipid panel. 3. Obesity, morbid ([address]  Strongly encouraged dietary modifications and weight loss as above. Follow-up Disposition:  Return in about 4 weeks (around 5/16/2018) for Follow Up. Discussed expected course/resolution/complications of diagnosis in detail with patient.    Medication risks/benefits/costs/interactions/alternatives discussed with patient.    Pt was given after visit summary which includes diagnoses, current medications & vitals.    Pt expressed understanding with the diagnosis and plan          Subjective:      Ike Cordoba is a 27 y.o. female who presents for had concerns including Diabetes and Form Completion. Ms. Annel Henley is here for follow-up from previous abnormal labs. Recent A1c did reveal type 2 diabetes with a an A1c of 6.9%. Did discuss dietary modifications including the reduction of sugary drinks at last office visit which she has not attempted at this time. Currently reports her typical daily intake of one large meal a day with sugary drinks throughout the day. She admits towards poor water intake. She is not currently aerobically active. Current Outpatient Prescriptions   Medication Sig Dispense Refill    metFORMIN ER (GLUCOPHAGE XR) 500 mg tablet Take 1 Tab by mouth daily (with dinner). 30 Tab 1    hydroCHLOROthiazide (HYDRODIURIL) 25 mg tablet Take 1 Tab by mouth daily. 30 Tab 1    naproxen sodium (ALEVE) 220 mg tablet Take 220 mg by mouth two (2) times daily (with meals).  spironolactone (ALDACTONE) 25 mg tablet Take 1 Tab by mouth daily. 30 Tab 1       Allergies   Allergen Reactions    Cephalexin (Bulk) Unknown (comments)       ROS:   Complete review of systems was reviewed with pertinent information listed in HPI. Objective:     Visit Vitals    BP (!) 147/106 (BP 1 Location: Left arm, BP Patient Position: Sitting)    Pulse 91    Temp 98.2 °F (36.8 °C) (Oral)    Resp 18    Ht 5' 6\" (1.676 m)    Wt 325 lb (147.4 kg)    SpO2 99%    BMI 52.46 kg/m2       Vitals and Nurse Documentation reviewed. Physical Exam   Constitutional: No distress. Cardiovascular: S1 normal and S2 normal.  Exam reveals no gallop and no friction rub. No murmur heard. Pulmonary/Chest: Breath sounds normal. No respiratory distress. Skin: Skin is warm and dry.    Psychiatric: Mood and affect normal.

## 2018-05-01 ENCOUNTER — PATIENT OUTREACH (OUTPATIENT)
Dept: FAMILY MEDICINE CLINIC | Age: 33
End: 2018-05-01

## 2018-05-01 NOTE — PROGRESS NOTES
Outbound call made 18 to follow up re: diabetes management.  verified  Patient reports she does not always check her blood sugar daily. Blood sugar checked yesterday was 110mg/dl. Endorses eating grilled chicken and fried rice. NN educated patient that fried rice is not whole grain rice but white rice tinted brown with soy sauce. Patient verbalized understanding. Discussed low sodium foods  Endorses drinking at least one cup of water per day   Reminded patient of upcoming appointment scheduled 18- Patient aware  Advised to bring blood sugar log for review.  Patient verbalized understanding  NN to continue to follow

## 2018-05-03 ENCOUNTER — PATIENT OUTREACH (OUTPATIENT)
Dept: FAMILY MEDICINE CLINIC | Age: 33
End: 2018-05-03

## 2018-05-03 NOTE — PROGRESS NOTES
Returned call made. NN received message from Carlita, patients aunt, to discuss patients record. [facility] is not on HIPAA form. NN left voicemail message with contact information requesting return call from patient and to advise of authorization from patient in order to discuss her record with her aunt Jerryjairojorge luis.

## 2018-05-10 ENCOUNTER — PATIENT OUTREACH (OUTPATIENT)
Dept: FAMILY MEDICINE CLINIC | Age: 33
End: 2018-05-10

## 2018-05-10 NOTE — PROGRESS NOTES
Outbound call made 5/10/18 to follow up re: diabetes management  Patient endorses daily blood glucose monitoring  BS today 110mg/dl  Reminded patient to bring in her blood sugar record for pcp to review.    Patient expressed understanding  Reports she joined [employer] three days ago

## 2018-05-11 ENCOUNTER — OFFICE VISIT (OUTPATIENT)
Dept: FAMILY MEDICINE CLINIC | Age: 33
End: 2018-05-11

## 2018-05-11 VITALS
SYSTOLIC BLOOD PRESSURE: 121 MMHG | BODY MASS INDEX: 47.09 KG/M2 | OXYGEN SATURATION: 96 % | RESPIRATION RATE: 16 BRPM | DIASTOLIC BLOOD PRESSURE: 69 MMHG | WEIGHT: 293 LBS | HEART RATE: 70 BPM | TEMPERATURE: 98 F | HEIGHT: 66 IN

## 2018-05-11 DIAGNOSIS — E11.9 CONTROLLED TYPE 2 DIABETES MELLITUS WITHOUT COMPLICATION, WITHOUT LONG-TERM CURRENT USE OF INSULIN (HCC): ICD-10-CM

## 2018-05-11 RX ORDER — METFORMIN HYDROCHLORIDE 500 MG/1
1000 TABLET, EXTENDED RELEASE ORAL
Qty: 60 TAB | Refills: 3 | Status: SHIPPED | OUTPATIENT
Start: 2018-05-11 | End: 2019-02-14 | Stop reason: SDUPTHER

## 2018-05-11 NOTE — PATIENT INSTRUCTIONS
8 ounces or one cup water  1 scoop protein powder  Half a frozen banana or half a cup cooked oatmeal  Frozen strawberries 1 cup  Handful of spinach

## 2018-05-11 NOTE — PROGRESS NOTES
Chief Complaint   Patient presents with    Hypertension     Follow up    Diabetes     1. Have you been to the ER, urgent care clinic since your last visit? Hospitalized since your last visit? No    2. Have you seen or consulted any other health care providers outside of the [address] [place] [address] since your last visit? Include any pap smears or colon screening. Yes Patient had an eye exam at [facility] clinic on 01/18.

## 2018-05-11 NOTE — MR AVS SNAPSHOT
[facility] 
 
 
 [address] 
[phone] Patient: David Orozco MRN: [id number]:[id number] Visit Information Date & Time Provider Department Dept. Phone Encounter #  
 5/11/2018  9:15 AM Barbara Diaz  [facility] [phone] [id number] Follow-up Instructions Return in about 4 weeks (around 6/8/2018) for Follow Up. Upcoming Health Maintenance Date Due  
 FOOT EXAM Q1 9/7/1993 MICROALBUMIN Q1 9/7/1993 EYE EXAM RETINAL OR DILATED Q1 9/7/1993 Pneumococcal 19-64 Medium Risk (1 of 1 - PPSV23) 9/7/2002 DTaP/Tdap/Td series (1 - Tdap) 9/7/2004 PAP AKA CERVICAL CYTOLOGY 7/31/2018* Influenza Age 5 to Adult 9/1/2018* HEMOGLOBIN A1C Q6M 10/13/2018 LIPID PANEL Q1 4/13/2019 *Topic was postponed. The date shown is not the original due date. Allergies as of 5/11/2018  Review Complete On: 5/11/2018 By: Barbara Diaz NP Severity Noted Reaction Type Reactions Cephalexin (Bulk)  03/30/2012    Unknown (comments) Current Immunizations  Never Reviewed No immunizations on file. Not reviewed this visit You Were Diagnosed With   
  
 Codes Comments Controlled type 2 diabetes mellitus without complication, without long-term current use of insulin ([facility]     ICD-10-CM: E11.9 ICD-9-CM: 250.00 Vitals BP Pulse Temp Resp Height(growth percentile) Weight(growth percentile) 121/69 (BP 1 Location: Left arm, BP Patient Position: Sitting) 70 98 °F (36.7 °C) (Oral) 16 5' 6\" (1.676 m) 323 lb 9.6 oz (146.8 kg) SpO2 BMI OB Status Smoking Status 96% 52.23 kg/m2 Polycystic Ovarian Syndrome Never Smoker Vitals History BMI and BSA Data Body Mass Index Body Surface Area  
 52.23 kg/m 2 2.61 m 2 Preferred Pharmacy Pharmacy Name Phone [facility] [address] [place] [address] [phone] Your Updated Medication List  
  
   
This list is accurate as of 5/11/18 10:04 AM.  Always use your most recent med list.  
  
  
  
  
 ALEVE 220 mg tablet Generic drug:  naproxen sodium Take 220 mg by mouth two (2) times daily (with meals). hydroCHLOROthiazide 25 mg tablet Commonly known as:  HYDRODIURIL Take 1 Tab by mouth daily. metFORMIN  mg tablet Commonly known as:  GLUCOPHAGE XR Take 2 Tabs by mouth daily (with breakfast). spironolactone 25 mg tablet Commonly known as:  ALDACTONE Take 1 Tab by mouth daily. Prescriptions Sent to Pharmacy Refills  
 metFORMIN ER (GLUCOPHAGE XR) 500 mg tablet 3 Sig: Take 2 Tabs by mouth daily (with breakfast). Class: Normal  
 Pharmacy: [address] [place] [address] #: [phone] Route: Oral  
  
We Performed the Following  DIABETES FOOT EXAM [[facility]] Follow-up Instructions Return in about 4 weeks (around 6/8/2018) for Follow Up. Patient Instructions 8 ounces or one cup water 1 scoop protein powder Half a frozen banana or half a cup cooked oatmeal 
Frozen strawberries 1 cup Handful of spinach Introducing [facility]! Dear Theodora Johnson: 
Thank you for requesting a [employer] account. Our records indicate that you already have an active [employer] account. You can access your account anytime at [email or web address] Did you know that you can access your hospital and ER discharge instructions at any time in [employer]? You can also review all of your test results from your hospital stay or ER visit. Additional Information If you have questions, please visit the Frequently Asked Questions section of the [employer] website at [email or web address] Remember, [employer] is NOT to be used for urgent needs. For medical emergencies, dial 911. Now available from your iPhone and Android! Please provide this summary of care documentation to your next provider. Your primary care clinician is listed as Misael Ward. If you have any questions after today's visit, please call [phone].

## 2018-05-11 NOTE — PROGRESS NOTES
Assessment/Plan:     Diagnoses and all orders for this visit:    1. Controlled type 2 diabetes mellitus without complication, without long-term current use of insulin ([facility])  -      DIABETES FOOT EXAM  -     metFORMIN ER (GLUCOPHAGE XR) 500 mg tablet; Take 2 Tabs by mouth daily (with breakfast). Continue to increase dose to max of 4 tablets daily. She will establish with ophthalmology. She will follow-up in 4 weeks for return visit. Significant improvement in her blood pressure. I have congratulated her on her positive lifestyle changes. Follow-up Disposition:  Return in about 4 weeks (around 6/8/2018) for Follow Up. Discussed expected course/resolution/complications of diagnosis in detail with patient.    Medication risks/benefits/costs/interactions/alternatives discussed with patient.    Pt was given after visit summary which includes diagnoses, current medications & vitals. Pt expressed understanding with the diagnosis and plan          Subjective:      Miya Brown is a 27 y.o. female who presents for had concerns including Hypertension and Diabetes. Hypertension  Patient is here for follow-up of hypertension. She indicates that she is feeling well and denies any symptoms referable to her hypertension, including chest pain, palpitations, dyspnea, orthopnea, or peripheral edema. Patient did not start the medication. She is exercising and is adherent to low salt diet. Blood pressure is well controlled at home. Use of agents associated with hypertension: none. History of renal disease: no.  Cardiovascular risk factors: diabetes mellitus, obesity, sedentary life style, hypertension. Has made significant dietary modifications. Is doing [employer]. Patient is a 27 y.o. female that comes today for follow up of Diabetes Mellitus Type 2. Patient does regularly monitor  their FSBG at home.  The fasting plasma glucose (fpg) usually runs around 110-130 mg/L. follows a diabetic diet regularly  Patients activity level: minimal physical exertion, confined to activities of daily living  lost,  2 lbs. .  The patient does not experienced recent hypoglycemia. reports that she has never smoked. She has never used smokeless tobacco.   Tolerating the Metformin without diarrhea. Has made significant dietary modifications and reports compliance with treatment. Lab Results   Component Value Date/Time    Hemoglobin A1c 6.9 (H) 04/13/2018 04:42 PM         Current Outpatient Prescriptions   Medication Sig Dispense Refill    metFORMIN ER (GLUCOPHAGE XR) 500 mg tablet Take 2 Tabs by mouth daily (with breakfast). 60 Tab 3    hydroCHLOROthiazide (HYDRODIURIL) 25 mg tablet Take 1 Tab by mouth daily. 30 Tab 1    naproxen sodium (ALEVE) 220 mg tablet Take 220 mg by mouth two (2) times daily (with meals).  spironolactone (ALDACTONE) 25 mg tablet Take 1 Tab by mouth daily. 30 Tab 1       Allergies   Allergen Reactions    Cephalexin (Bulk) Unknown (comments)       ROS:   Complete review of systems was reviewed with pertinent information listed in HPI. Objective:     Visit Vitals    /69 (BP 1 Location: Left arm, BP Patient Position: Sitting)    Pulse 70    Temp 98 °F (36.7 °C) (Oral)    Resp 16    Ht 5' 6\" (1.676 m)    Wt 323 lb 9.6 oz (146.8 kg)    SpO2 96%    BMI 52.23 kg/m2       Vitals and Nurse Documentation reviewed. Physical Exam   Constitutional: No distress. Cardiovascular: S1 normal and S2 normal.  Exam reveals no gallop and no friction rub. No murmur heard. Pulmonary/Chest: Breath sounds normal. No respiratory distress. Skin: Skin is warm and dry. Monofilament intact bilaterally. Pulses R: 2+ and L: 2+. No open wounds. No skin lesions.    Psychiatric: Mood and affect normal.

## 2018-07-31 ENCOUNTER — OFFICE VISIT (OUTPATIENT)
Dept: FAMILY MEDICINE CLINIC | Age: 33
End: 2018-07-31

## 2018-07-31 VITALS
BODY MASS INDEX: 47.09 KG/M2 | TEMPERATURE: 98.5 F | RESPIRATION RATE: 20 BRPM | DIASTOLIC BLOOD PRESSURE: 46 MMHG | HEART RATE: 81 BPM | HEIGHT: 66 IN | OXYGEN SATURATION: 98 % | WEIGHT: 293 LBS | SYSTOLIC BLOOD PRESSURE: 140 MMHG

## 2018-07-31 DIAGNOSIS — M25.511 RIGHT SHOULDER PAIN, UNSPECIFIED CHRONICITY: Primary | ICD-10-CM

## 2018-07-31 LAB
HCG URINE, QL. (POC): NEGATIVE
VALID INTERNAL CONTROL?: YES

## 2018-07-31 RX ORDER — CYCLOBENZAPRINE HCL 10 MG
10 TABLET ORAL AS NEEDED
COMMUNITY
Start: 2018-07-09 | End: 2019-01-28

## 2018-07-31 RX ORDER — MAGNESIUM HYDROXIDE 400 MG/5ML
SUSPENSION, ORAL (FINAL DOSE FORM) ORAL
Refills: 6 | COMMUNITY
Start: 2018-06-14 | End: 2019-01-28

## 2018-07-31 RX ORDER — IBUPROFEN 600 MG/1
600 TABLET ORAL
COMMUNITY
Start: 2018-07-09 | End: 2018-07-31

## 2018-07-31 RX ORDER — NAPROXEN 500 MG/1
500 TABLET ORAL
Qty: 60 TAB | Refills: 0 | Status: SHIPPED | OUTPATIENT
Start: 2018-07-31 | End: 2019-01-28

## 2018-07-31 NOTE — MR AVS SNAPSHOT
[address] [facility] 
 
 
 [address] 
[phone] Patient: Louise Arevalo MRN: [id number] Visit Information Date & Time Provider Department Dept. Phone Encounter #  
 7/31/2018  5:45 PM Teddy Jacobo  [facility] [phone] [id number] Follow-up Instructions Return if symptoms worsen or fail to improve. Your Appointments 7/31/2018  5:45 PM  
ROUTINE CARE with Teddy Jacobo MD  
[facility]) Appt Note: Both Shoulder Pain Running down R Arm/No cp/dw; Both Shoulder Pain Running down R Arm/No cp/dw [address] [id number]  
[phone]  
  
   
 [address] [address] Upcoming Health Maintenance Date Due MICROALBUMIN Q1 9/7/1993 EYE EXAM RETINAL OR DILATED Q1 9/7/1993 Pneumococcal 19-64 Medium Risk (1 of 1 - PPSV23) 9/7/2002 DTaP/Tdap/Td series (1 - Tdap) 9/7/2004 PAP AKA CERVICAL CYTOLOGY 7/31/2018* Influenza Age 5 to Adult 9/1/2018* HEMOGLOBIN A1C Q6M 10/13/2018 LIPID PANEL Q1 4/13/2019 FOOT EXAM Q1 5/11/2019 *Topic was postponed. The date shown is not the original due date. Allergies as of 7/31/2018  Review Complete On: 7/31/2018 By: Alivia Holland Severity Noted Reaction Type Reactions Latex High   Rash Cephalexin (Bulk)  03/30/2012    Unknown (comments) Current Immunizations  Never Reviewed No immunizations on file. Not reviewed this visit You Were Diagnosed With   
  
 Codes Comments Right shoulder pain, unspecified chronicity    -  Primary ICD-10-CM: M25.511 ICD-9-CM: 719.41 Vitals BP Pulse Temp Resp Height(growth percentile) Weight(growth percentile) 140/46 (BP 1 Location: Left arm, BP Patient Position: Sitting) 81 98.5 °F (36.9 °C) (Oral) 20 5' 6\" (1.676 m) 322 lb 9.6 oz (146.3 kg) SpO2 BMI OB Status Smoking Status 98% 52.07 kg/m2 Polycystic Ovarian Syndrome Never Smoker Vitals History BMI and BSA Data Body Mass Index Body Surface Area 52.07 kg/m 2 2.61 m 2 Preferred Pharmacy Pharmacy Name Phone [facility] [place] [address] [place] [address] [phone] Your Updated Medication List  
  
   
This list is accurate as of 7/31/18  5:38 PM.  Always use your most recent med list.  
  
  
  
  
 ALEVE 220 mg tablet Generic drug:  naproxen sodium Take 220 mg by mouth two (2) times daily (with meals). ANTISEPTIC SKIN CLNSR(CHLORHE) 4 % liquid Generic drug:  chlorhexidine USE AS DAILY WASH PRN  
  
 cyclobenzaprine 10 mg tablet Commonly known as:  FLEXERIL Take 10 mg by mouth as needed. hydroCHLOROthiazide 25 mg tablet Commonly known as:  HYDRODIURIL Take 1 Tab by mouth daily. ibuprofen 600 mg tablet Commonly known as:  MOTRIN Take 600 mg by mouth every eight (8) hours as needed. metFORMIN  mg tablet Commonly known as:  GLUCOPHAGE XR Take 2 Tabs by mouth daily (with breakfast). spironolactone 25 mg tablet Commonly known as:  ALDACTONE Take 1 Tab by mouth daily. We Performed the Following AMB POC URINE PREGNANCY TEST, VISUAL COLOR COMPARISON [54882 CPT(R)] Follow-up Instructions Return if symptoms worsen or fail to improve. To-Do List   
 07/31/2018 Imaging:  XR SHOULDER RT AP/LAT MIN 2 V Patient Instructions Shoulder Blade: Exercises Your Care Instructions Here are some examples of typical exercises for your condition. Start each exercise slowly. Ease off the exercise if you start to have pain. Your doctor or physical therapist will tell you when you can start these exercises and which ones will work best for you. How to do the exercises Shoulder roll 1. Stand tall with your chin slightly tucked. Imagine that a string at the top of your head is pulling you straight up. 2. Keep your arms relaxed. All motion will be in your shoulders. 3. Shrug your shoulders up toward your ears, then up and back. [place] your shoulders down and back, like you're sliding your hands down into your back pants pockets. 4. Repeat the circles at least 2 to 4 times. 5. This exercise is also helpful anytime you want to relax. Lower neck and upper back stretch 1. With your arms about shoulder height, clasp your hands in front of you. 2. Drop your chin toward your chest. 
3. Reach straight forward so you are rounding your upper back. Think about pulling your shoulder blades apart. [place] feel a stretch across your upper back and shoulders. Hold for at least 6 seconds. 4. Repeat 2 to 4 times. Triceps stretch 1. Reach your arm straight up. 2. Keeping your elbow in place, bend your arm and reach your hand down behind your back. 3. With your other hand, apply gentle pressure to the bent elbow. [place] feel a stretch at the back of your upper arm and shoulder. Hold about 6 seconds. 4. Repeat 2 to 4 times with each arm. Shoulder stretch 1. Relax your shoulders. 2. Raise one arm to shoulder height, and reach it across your chest. 
3. Pull the arm slightly toward you with your other arm. This will help you get a gentle stretch. Hold for about 6 seconds. 4. Repeat 2 to 4 times. Shoulder blade squeeze 1. Sit or stand up tall with your arms at your sides. 2. Keep your shoulders relaxed and down, not shrugged. 3. Squeeze your shoulder blades together. Hold for 6 seconds, then relax. 4. Repeat 8 to 12 times. Straight-arm shoulder blade squeeze 1. Sit or stand tall. Relax your shoulders. 2. With palms down, hold your elastic tubing or band straight out in front of you. 3. Start with slight tension in the tubing or band, with your hands about shoulder-width apart. 4. Slowly pull straight out to the sides, squeezing your shoulder blades together. Keep your arms straight and at shoulder height. Slowly release. 5. Repeat 8 to 12 times. Rowing 1. [place] your elastic tubing or band at about waist height. Take one end in each hand. 2. Sit or stand with your feet hip-width apart. 3. Hold your arms straight in front of you. Adjust your distance to create slight tension in the tubing or band. 4. Slightly tuck your chin. Relax your shoulders. 5. Without shrugging your shoulders, pull straight back. Your elbows will pass alongside your waist. 
Pull-downs 1. [place] your elastic tubing or band in the top of a closed door. Take one end in each hand. 2. Either sit or stand, depending on what is more comfortable. If you feel unsteady, sit on a chair. 3. Start with your arms up and comfortably apart, elbows straight. There should be a slight tension in the tubing or band. 4. Slightly tuck your chin, and look straight ahead. 5. Keeping your back straight, slowly pull down and back, bending your elbows. 6. Stop where your hands are level with your chin, in a \"goalpost\" position. 7. Repeat 8 to 12 times. Chest T stretch 1. Lie on your back. Raise your knees so they are bent. Plant your feet on the floor, hip-width apart. 2. Tuck your chin, and relax your shoulders. 3. Reach your arms straight out to the sides. If you don't feel a mild stretch in your shoulders and across your chest, use a foam roll or a tightly rolled blanket under your spine, from your tailbone to your head. 4. Relax in this position for at least 15 to 30 seconds while you breathe normally. Repeat 2 to 4 times. 5. As you get used to this stretch, keep adding a little more time until you are able relax in this position for 2 or 3 minutes. When you can relax for at least 2 minutes, you only need to do the exercise 1 time per session. Chest goalpost stretch 1. Lie on your back. Raise your knees so they are bent. Plant your feet on the floor, hip-width apart. 2. Tuck your chin, and relax your shoulders. 3. Reach your arms straight out to the sides. 4. Bend your arms at the elbows, with your hands pointed toward the top of your head. Your arms should make an L on either side of your head. Your palms should be facing up. 5. If you don't feel a mild stretch in your shoulders and across your chest, use a foam roll or tightly rolled blanket under your spine, from your tailbone to your head. 6. Relax in this position for at least 15 to 30 seconds while you breathe normally. Repeat 2 to 4 times. 7. Each day you do this exercise, add a little more time until you can relax in this position for 2 or 3 minutes. When you can relax for at least 2 minutes, you only need to do the exercise 1 time per session. Follow-up care is a key part of your treatment and safety. Be sure to make and go to all appointments, and call your doctor if you are having problems. It's also a good idea to know your test results and keep a list of the medicines you take. Where can you learn more? Go to http://kerry-faustino.info/. Enter (44) 3420 8565 in the search box to learn more about \"Shoulder Blade: Exercises. \" Current as of: November 29, 2017 Content Version: 11.7 © 1238-3353 [employer]. Care instructions adapted under license by [employer] (which disclaims liability or warranty for this information). If you have questions about a medical condition or this instruction, always ask your healthcare professional. [place] [address] any warranty or liability for your use of this information. Shoulder Arthritis: Exercises Your Care Instructions Here are some examples of typical rehabilitation exercises for your condition. Start each exercise slowly. Ease off the exercise if you start to have pain. Your doctor or physical therapist will tell you when you can start these exercises and which ones will work best for you. How to do the exercises Shoulder flexion (lying down) To make a wand for this exercise, use a piece of PVC pipe or a broom handle with the broom removed. Make the wand about a foot wider than your shoulders. 6. Lie on your back, holding a wand with both hands. Your palms should face down as you hold the wand. 7. Keeping your elbows straight, slowly raise your arms over your head. Raise them until you feel a stretch in your shoulders, upper back, and chest. 
8. Hold for 15 to 30 seconds. 9. Repeat 2 to 4 times. Shoulder rotation (lying down) To make a wand for this exercise, use a piece of PVC pipe or a broom handle with the broom removed. Make the wand about a foot wider than your shoulders. 5. Lie on your back. Hold a wand with both hands with your elbows bent and palms up. 6. Keep your elbows close to your body, and move the wand across your body toward the sore arm. 7. Hold for 8 to 12 seconds. 8. Repeat 2 to 4 times. Shoulder internal rotation with towel 5. Hold a towel above and behind your head with the arm that is not sore. 6. With your sore arm, reach behind your back and grasp the towel. 7. With the arm above your head, pull the towel upward. Do this until you feel a stretch on the front and outside of your sore shoulder. 8. Hold 15 to 30 seconds. 9. Repeat 2 to 4 times. Shoulder blade squeeze 5. Stand with your arms at your sides, and squeeze your shoulder blades together. Do not raise your shoulders up as you squeeze. 6. Hold 6 seconds. 7. Repeat 8 to 12 times. Resisted rows For this exercise, you will need elastic exercise material, such as surgical tubing or Thera-Band. 5. Put the band around a solid object at about waist level. (A bedpost will work well.) Each hand should hold an end of the band. 6. With your elbows at your sides and bent to 90 degrees, pull the band back. Your shoulder blades should move toward each other. Return to the starting position. 7. Repeat 8 to 12 times. External rotator strengthening exercise 6. Start by tying a piece of elastic exercise material to a doorknob. You can use surgical tubing or Thera-Band. (You may also hold one end of the band in each hand.) 7. Stand or sit with your shoulder relaxed and your elbow bent 90 degrees. Your upper arm should rest comfortably against your side. Squeeze a rolled towel between your elbow and your body for comfort. This will help keep your arm at your side. 8. Hold one end of the elastic band with the hand of the painful arm. 9. Start with your forearm across your belly. Slowly rotate the forearm out away from your body. Keep your elbow and upper arm tucked against the towel roll or the side of your body until you begin to feel tightness in your shoulder. Slowly move your arm back to where you started. 10. Repeat 8 to 12 times. Internal rotator strengthening exercise 6. Start by tying a piece of elastic exercise material to a doorknob. You can use surgical tubing or Thera-Band. 7. Stand or sit with your shoulder relaxed and your elbow bent 90 degrees. Your upper arm should rest comfortably against your side. Squeeze a rolled towel between your elbow and your body for comfort. This will help keep your arm at your side. 8. Hold one end of the elastic band in the hand of the painful arm. 9. Slowly rotate your forearm toward your body until it touches your belly. Slowly move it back to where you started. 10. Keep your elbow and upper arm firmly tucked against the towel roll or at your side. 11. Repeat 8 to 12 times. Pendulum swing If you have pain in your back, do not do this exercise. 8. Hold on to a table or the back of a chair with your good arm.  Then bend forward a little and let your sore arm hang straight down. This exercise does not use the arm muscles. Rather, use your legs and your hips to create movement that makes your arm swing freely. 9. Use the movement from your hips and legs to guide the slightly swinging arm back and forth like a pendulum (or elephant trunk). Then guide it in circles that start small (about the size of a dinner plate). Make the circles a bit larger each day, as your pain allows. 10. Do this exercise for 5 minutes, 5 to 7 times each day. 11. As you have less pain, try bending over a little farther to do this exercise. This will increase the amount of movement at your shoulder. Follow-up care is a key part of your treatment and safety. Be sure to make and go to all appointments, and call your doctor if you are having problems. It's also a good idea to know your test results and keep a list of the medicines you take. Where can you learn more? Go to http://kerry-faustino.info/. Enter H562 in the search box to learn more about \"Shoulder Arthritis: Exercises. \" Current as of: November 29, 2017 Content Version: 11.7 © 3184-6967 [place]. Care instructions adapted under license by Avexxin (which disclaims liability or warranty for this information). If you have questions about a medical condition or this instruction, always ask your healthcare professional. [address] any warranty or liability for your use of this information. Introducing [facility]! Dear Nicki Marina: 
Thank you for requesting a [facility] account. Our records indicate that you already have an active [facility] account. You can access your account anytime at [email or web address]. [email or web address] Did you know that you can access your hospital and ER discharge instructions at any time in [facility]? You can also review all of your test results from your hospital stay or ER visit. Additional Information If you have questions, please visit the Frequently Asked Questions section of the [employer] website at [email or web address] Remember, [employer] is NOT to be used for urgent needs. For medical emergencies, dial 911. Now available from your iPhone and Android! Please provide this summary of care documentation to your next provider. Your primary care clinician is listed as Cain Beatty. If you have any questions after today's visit, please call [phone].

## 2018-07-31 NOTE — PROGRESS NOTES
Chief Complaint   Patient presents with    Shoulder Pain     right - radiating down to amr and finger - patient reports that right hand feels numb when she wakes up in the morning     1. Have you been to the ER, urgent care clinic since your last visit? Hospitalized since your last visit? Yes, patient went to [address] [place] [address] 7/09/18 due to shoulder pain. 2. Have you seen or consulted any other health care providers outside of the [address] [place] [address] since your last visit? Include any pap smears or colon screening.  No

## 2018-07-31 NOTE — PROGRESS NOTES
Patient Name: Helena Barr   MRN: [id number]    Darlen Pallas is a 27 y.o. female who presents with the following:     Patient reports chronic history of intermittent right shoulder pain but states that pain has been more severe and consistent since the beginning of this month. Pain is located behind her R shoulder blade but radiates down her shoulder, arm, and hand. She was seen in the ER on July 9 for her severe right shoulder pain. Was told that there was a lot of swelling in her shoulder and she was discharged with Flexeril and ibuprofen. She has been taking ibuprofen twice a day and Flexeril at night which seems to be working. States that she tends to sleep on the right side and she will wake up with her fingers feeling numb. Review of Systems   Constitutional: Negative for fever, malaise/fatigue and weight loss. Respiratory: Negative for cough, hemoptysis, shortness of breath and wheezing. Cardiovascular: Negative for chest pain, palpitations, leg swelling and PND. Gastrointestinal: Negative for abdominal pain, constipation, diarrhea, nausea and vomiting. Musculoskeletal: Positive for joint pain. The patient's medications, allergies, past medical history, surgical history, family history and social history were reviewed and updated where appropriate. Prior to Admission medications    Medication Sig Start Date End Date Taking? Authorizing Provider   cyclobenzaprine (FLEXERIL) 10 mg tablet Take 10 mg by mouth as needed. 7/9/18  Yes Historical Provider   ibuprofen (MOTRIN) 600 mg tablet Take 600 mg by mouth every eight (8) hours as needed. 7/9/18  Yes Historical Provider   ANTISEPTIC SKIN CLNSR,CHLORHE, 4 % liquid USE AS DAILY [address] PRN 6/14/18  Yes Historical Provider   metFORMIN ER (GLUCOPHAGE XR) 500 mg tablet Take 2 Tabs by mouth daily (with breakfast). 5/11/18  Yes Luz Finley NP   hydroCHLOROthiazide (HYDRODIURIL) 25 mg tablet Take 1 Tab by mouth daily. 4/18/18  Yes Luz Finley NP   naproxen sodium (ALEVE) 220 mg tablet Take 220 mg by mouth two (2) times daily (with meals). Yes Historical Provider   spironolactone (ALDACTONE) 25 mg tablet Take 1 Tab by mouth daily. 4/13/18  Yes Penny Finley NP       Allergies   Allergen Reactions    Latex Rash    Cephalexin (Bulk) Unknown (comments)           OBJECTIVE    Visit Vitals    /46 (BP 1 Location: Left arm, BP Patient Position: Sitting)    Pulse 81    Temp 98.5 °F (36.9 °C) (Oral)    Resp 20    Ht 5' 6\" (1.676 m)    Wt 322 lb 9.6 oz (146.3 kg)    SpO2 98%    BMI 52.07 kg/m2       Physical Exam   Constitutional: She is oriented to person, place, and time and well-developed, well-nourished, and in no distress. No distress. Eyes: Conjunctivae and EOM are normal. Pupils are equal, round, and reactive to light. Musculoskeletal:        Right shoulder: She exhibits decreased range of motion (limited internal rotation; normal ROM otherwise) and tenderness (TTP along R scapula). She exhibits no bony tenderness, no swelling, no deformity, normal pulse and normal strength (negative Hawkin's/empty can). Cervical back: Normal. She exhibits normal range of motion, no tenderness, no swelling and no deformity. Neurological: She is alert and oriented to person, place, and time. Gait normal.   Skin: Skin is warm and dry. She is not diaphoretic. Psychiatric: Mood, memory, affect and judgment normal.   Nursing note and vitals reviewed. ASSESSMENT AND PLAN  Eliza Talbot is a 27 y.o. female who presents today for:    1. Right shoulder pain, unspecified chronicity  Suspect possible trapezius strain, shoulder bursitis, RTC pathology, or OA. Will obtain xray. Recommend home exercises, naproxen, heat/ice. UPT negative today.   Reviewed signs and symptoms that would indicate a worsening medical condition which would require immediate evaluation and treatment; patient expressed understanding of plan.  Consider PT if no improvement. - XR SHOULDER RT AP/LAT MIN 2 V; Future  - AMB POC URINE PREGNANCY TEST, VISUAL COLOR COMPARISON  - naproxen (NAPROSYN) 500 mg tablet; Take 1 Tab by mouth two (2) times daily as needed. Dispense: 60 Tab; Refill: 0       Medications Discontinued During This Encounter   Medication Reason    naproxen sodium (ALEVE) 220 mg tablet Not A Current Medication    ibuprofen (MOTRIN) 600 mg tablet Not A Current Medication       Follow-up Disposition:  Return if symptoms worsen or fail to improve. Medication risks/benefits/costs/interactions/alternatives discussed with patient. Advised patient to call back or return to office if symptoms worsen/change/persist. If patient cannot reach us or should anything more severe/urgent arise he/she should proceed directly to the nearest emergency department. Discussed expected course/resolution/complications of diagnosis in detail with patient. Patient given a written after visit summary which includes his/her diagnoses, current medications and vitals. Patient expressed understanding with the diagnosis and plan.      Anthony Henley M.D.

## 2018-07-31 NOTE — PATIENT INSTRUCTIONS
Shoulder Blade: Exercises  Your Care Instructions  Here are some examples of typical exercises for your condition. Start each exercise slowly. Ease off the exercise if you start to have pain. Your doctor or physical therapist will tell you when you can start these exercises and which ones will work best for you. How to do the exercises  Shoulder roll    1. Stand tall with your chin slightly tucked. Imagine that a string at the top of your head is pulling you straight up. 2. Keep your arms relaxed. All motion will be in your shoulders. 3. Shrug your shoulders up toward your ears, then up and back. [place] your shoulders down and back, like you're sliding your hands down into your back pants pockets. 4. Repeat the circles at least 2 to 4 times. 5. This exercise is also helpful anytime you want to relax. Lower neck and upper back stretch    1. With your arms about shoulder height, clasp your hands in front of you. 2. Drop your chin toward your chest.  3. Reach straight forward so you are rounding your upper back. Think about pulling your shoulder blades apart. Chastity Germain feel a stretch across your upper back and shoulders. Hold for at least 6 seconds. 4. Repeat 2 to 4 times. Triceps stretch    1. Reach your arm straight up. 2. Keeping your elbow in place, bend your arm and reach your hand down behind your back. 3. With your other hand, apply gentle pressure to the bent elbow. Chastity Germain feel a stretch at the back of your upper arm and shoulder. Hold about 6 seconds. 4. Repeat 2 to 4 times with each arm. Shoulder stretch    1. Relax your shoulders. 2. Raise one arm to shoulder height, and reach it across your chest.  3. Pull the arm slightly toward you with your other arm. This will help you get a gentle stretch. Hold for about 6 seconds. 4. Repeat 2 to 4 times. Shoulder blade squeeze    1. Sit or stand up tall with your arms at your sides. 2. Keep your shoulders relaxed and down, not shrugged.   3. Squeeze your shoulder blades together. Hold for 6 seconds, then relax. 4. Repeat 8 to 12 times. Straight-arm shoulder blade squeeze    1. Sit or stand tall. Relax your shoulders. 2. With palms down, hold your elastic tubing or band straight out in front of you. 3. Start with slight tension in the tubing or band, with your hands about shoulder-width apart. 4. Slowly pull straight out to the sides, squeezing your shoulder blades together. Keep your arms straight and at shoulder height. Slowly release. 5. Repeat 8 to 12 times. Rowing    1. [place] your elastic tubing or band at about waist height. Take one end in each hand. 2. Sit or stand with your feet hip-width apart. 3. Hold your arms straight in front of you. Adjust your distance to create slight tension in the tubing or band. 4. Slightly tuck your chin. Relax your shoulders. 5. Without shrugging your shoulders, pull straight back. Your elbows will pass alongside your waist.  Pull-downs    1. [place] your elastic tubing or band in the top of a closed door. Take one end in each hand. 2. Either sit or stand, depending on what is more comfortable. If you feel unsteady, sit on a chair. 3. Start with your arms up and comfortably apart, elbows straight. There should be a slight tension in the tubing or band. 4. Slightly tuck your chin, and look straight ahead. 5. Keeping your back straight, slowly pull down and back, bending your elbows. 6. Stop where your hands are level with your chin, in a \"goalpost\" position. 7. Repeat 8 to 12 times. Chest T stretch    1. Lie on your back. Raise your knees so they are bent. Plant your feet on the floor, hip-width apart. 2. Tuck your chin, and relax your shoulders. 3. Reach your arms straight out to the sides. If you don't feel a mild stretch in your shoulders and across your chest, use a foam roll or a tightly rolled blanket under your spine, from your tailbone to your head.   4. Relax in this position for at least 15 to 30 seconds while you breathe normally. Repeat 2 to 4 times. 5. As you get used to this stretch, keep adding a little more time until you are able relax in this position for 2 or 3 minutes. When you can relax for at least 2 minutes, you only need to do the exercise 1 time per session. Chest goalpost stretch    1. Lie on your back. Raise your knees so they are bent. Plant your feet on the floor, hip-width apart. 2. Tuck your chin, and relax your shoulders. 3. Reach your arms straight out to the sides. 4. Bend your arms at the elbows, with your hands pointed toward the top of your head. Your arms should make an L on either side of your head. Your palms should be facing up. 5. If you don't feel a mild stretch in your shoulders and across your chest, use a foam roll or tightly rolled blanket under your spine, from your tailbone to your head. 6. Relax in this position for at least 15 to 30 seconds while you breathe normally. Repeat 2 to 4 times. 7. Each day you do this exercise, add a little more time until you can relax in this position for 2 or 3 minutes. When you can relax for at least 2 minutes, you only need to do the exercise 1 time per session. Follow-up care is a key part of your treatment and safety. Be sure to make and go to all appointments, and call your doctor if you are having problems. It's also a good idea to know your test results and keep a list of the medicines you take. Where can you learn more? Go to http://kerry-faustino.info/. Enter [phone] in the search box to learn more about \"Shoulder Blade: Exercises. \"  Current as of: November 29, 2017  Content Version: 11.7  © 5480-4365 [employer]. Care instructions adapted under license by [employer] (which disclaims liability or warranty for this information).  If you have questions about a medical condition or this instruction, always ask your healthcare professional. Irma Colvin any warranty or liability for your use of this information. Shoulder Arthritis: Exercises  Your Care Instructions  Here are some examples of typical rehabilitation exercises for your condition. Start each exercise slowly. Ease off the exercise if you start to have pain. Your doctor or physical therapist will tell you when you can start these exercises and which ones will work best for you. How to do the exercises  Shoulder flexion (lying down)    To make a wand for this exercise, use a piece of PVC pipe or a broom handle with the broom removed. Make the wand about a foot wider than your shoulders. 6. Lie on your back, holding a wand with both hands. Your palms should face down as you hold the wand. 7. Keeping your elbows straight, slowly raise your arms over your head. Raise them until you feel a stretch in your shoulders, upper back, and chest.  8. Hold for 15 to 30 seconds. 9. Repeat 2 to 4 times. Shoulder rotation (lying down)    To make a wand for this exercise, use a piece of PVC pipe or a broom handle with the broom removed. Make the wand about a foot wider than your shoulders. 5. Lie on your back. Hold a wand with both hands with your elbows bent and palms up. 6. Keep your elbows close to your body, and move the wand across your body toward the sore arm. 7. Hold for 8 to 12 seconds. 8. Repeat 2 to 4 times. Shoulder internal rotation with towel    5. Hold a towel above and behind your head with the arm that is not sore. 6. With your sore arm, reach behind your back and grasp the towel. 7. With the arm above your head, pull the towel upward. Do this until you feel a stretch on the front and outside of your sore shoulder. 8. Hold 15 to 30 seconds. 9. Repeat 2 to 4 times. Shoulder blade squeeze    5. Stand with your arms at your sides, and squeeze your shoulder blades together. Do not raise your shoulders up as you squeeze. 6. Hold 6 seconds. 7. Repeat 8 to 12 times.   Resisted rows    For this exercise, you will need elastic exercise material, such as surgical tubing or Thera-Band. 5. Put the band around a solid object at about waist level. (A bedpost will work well.) Each hand should hold an end of the band. 6. With your elbows at your sides and bent to 90 degrees, pull the band back. Your shoulder blades should move toward each other. Return to the starting position. 7. Repeat 8 to 12 times. External rotator strengthening exercise    6. Start by tying a piece of elastic exercise material to a doorknob. You can use surgical tubing or Thera-Band. (You may also hold one end of the band in each hand.)  7. Stand or sit with your shoulder relaxed and your elbow bent 90 degrees. Your upper arm should rest comfortably against your side. Squeeze a rolled towel between your elbow and your body for comfort. This will help keep your arm at your side. 8. Hold one end of the elastic band with the hand of the painful arm. 9. Start with your forearm across your belly. Slowly rotate the forearm out away from your body. Keep your elbow and upper arm tucked against the towel roll or the side of your body until you begin to feel tightness in your shoulder. Slowly move your arm back to where you started. 10. Repeat 8 to 12 times. Internal rotator strengthening exercise    6. Start by tying a piece of elastic exercise material to a doorknob. You can use surgical tubing or Thera-Band. 7. Stand or sit with your shoulder relaxed and your elbow bent 90 degrees. Your upper arm should rest comfortably against your side. Squeeze a rolled towel between your elbow and your body for comfort. This will help keep your arm at your side. 8. Hold one end of the elastic band in the hand of the painful arm. 9. Slowly rotate your forearm toward your body until it touches your belly. Slowly move it back to where you started. 10. Keep your elbow and upper arm firmly tucked against the towel roll or at your side.   11. Repeat 8 to 12 times.  Pendulum swing    If you have pain in your back, do not do this exercise. 8. Hold on to a table or the back of a chair with your good arm. Then bend forward a little and let your sore arm hang straight down. This exercise does not use the arm muscles. Rather, use your legs and your hips to create movement that makes your arm swing freely. 9. Use the movement from your hips and legs to guide the slightly swinging arm back and forth like a pendulum (or elephant trunk). Then guide it in circles that start small (about the size of a dinner plate). Make the circles a bit larger each day, as your pain allows. 10. Do this exercise for 5 minutes, 5 to 7 times each day. 11. As you have less pain, try bending over a little farther to do this exercise. This will increase the amount of movement at your shoulder. Follow-up care is a key part of your treatment and safety. Be sure to make and go to all appointments, and call your doctor if you are having problems. It's also a good idea to know your test results and keep a list of the medicines you take. Where can you learn more? Go to http://kerry-faustino.info/. Enter H562 in the search box to learn more about \"Shoulder Arthritis: Exercises. \"  Current as of: November 29, 2017  Content Version: 11.7  © 0421-9394 [employer]. Care instructions adapted under license by [employer] (which disclaims liability or warranty for this information). If you have questions about a medical condition or this instruction, always ask your healthcare professional. [place] [address] any warranty or liability for your use of this information.

## 2018-08-04 DIAGNOSIS — I10 ESSENTIAL HYPERTENSION: ICD-10-CM

## 2018-08-04 DIAGNOSIS — E28.2 PCOS (POLYCYSTIC OVARIAN SYNDROME): ICD-10-CM

## 2018-08-04 DIAGNOSIS — E11.9 CONTROLLED TYPE 2 DIABETES MELLITUS WITHOUT COMPLICATION, WITHOUT LONG-TERM CURRENT USE OF INSULIN (HCC): ICD-10-CM

## 2018-08-06 RX ORDER — METFORMIN HYDROCHLORIDE 500 MG/1
TABLET, EXTENDED RELEASE ORAL
Qty: 30 TAB | Refills: 1 | Status: SHIPPED | OUTPATIENT
Start: 2018-08-06 | End: 2019-01-28

## 2018-08-06 RX ORDER — SPIRONOLACTONE 25 MG/1
TABLET ORAL
Qty: 30 TAB | Refills: 1 | Status: SHIPPED | OUTPATIENT
Start: 2018-08-06 | End: 2019-01-28

## 2018-08-06 RX ORDER — HYDROCHLOROTHIAZIDE 25 MG/1
TABLET ORAL
Qty: 30 TAB | Refills: 1 | Status: SHIPPED | OUTPATIENT
Start: 2018-08-06

## 2018-08-06 NOTE — TELEPHONE ENCOUNTER
Call to patient.  verified. Informed her she is overdue for routine follow up. She is scheduled for 18 with pcp.  Informed her the importance of regular follow up for her bp and dm

## 2018-08-21 ENCOUNTER — TELEPHONE (OUTPATIENT)
Dept: FAMILY MEDICINE CLINIC | Age: 33
End: 2018-08-21

## 2018-08-21 NOTE — TELEPHONE ENCOUNTER
Outgoing call to patient in regards to appointment on 8/22/18 with NP. Malia. Advised patient to return call back to office to reschedule appointment. Patient can be rescheduled for 8/29/18.      Best call back [phone]

## 2018-08-21 NOTE — TELEPHONE ENCOUNTER
Attempted to reach patient x 2. Left message that to reschedule due to provider being out of office.

## 2019-01-28 ENCOUNTER — OFFICE VISIT (OUTPATIENT)
Dept: FAMILY MEDICINE CLINIC | Age: 34
End: 2019-01-28

## 2019-01-28 VITALS
OXYGEN SATURATION: 99 % | SYSTOLIC BLOOD PRESSURE: 149 MMHG | HEIGHT: 66 IN | HEART RATE: 86 BPM | TEMPERATURE: 98 F | RESPIRATION RATE: 16 BRPM | DIASTOLIC BLOOD PRESSURE: 93 MMHG | WEIGHT: 293 LBS | BODY MASS INDEX: 47.09 KG/M2

## 2019-01-28 DIAGNOSIS — E11.9 CONTROLLED TYPE 2 DIABETES MELLITUS WITHOUT COMPLICATION, WITHOUT LONG-TERM CURRENT USE OF INSULIN (HCC): Primary | ICD-10-CM

## 2019-01-28 DIAGNOSIS — I10 ESSENTIAL HYPERTENSION: ICD-10-CM

## 2019-01-28 NOTE — PROGRESS NOTES
Assessment/Plan:     Diagnoses and all orders for this visit:    1. Controlled type 2 diabetes mellitus without complication, without long-term current use of insulin (HCC)  -     HEMOGLOBIN A1C WITH EAG  -     METABOLIC PANEL, COMPREHENSIVE  Continue to encourage dietary modification including reduction of sugar sweetened beverages. She appears pre-contemplative. She will continue metformin labs are pending as above. 2. Essential hypertension  Noncompliant with therapy. Restart HCTZ as previously recommended. She will call back if any difficulties. Time: Greater than 25 minutes was spent with this patient face to face discussing  diagnoses, risk factors and treatment with greater than 50% of this time was spent in counseling and coordination of care of dietary improvement and compliance with medications. Follow-up Disposition:  Return in about 3 months (around 4/28/2019) for Follow Up. Discussed expected course/resolution/complications of diagnosis in detail with patient.    Medication risks/benefits/costs/interactions/alternatives discussed with patient.    Pt was given after visit summary which includes diagnoses, current medications & vitals. Pt expressed understanding with the diagnosis and plan          Subjective:      Sue Hercules is a 26 y.o. female who presents for had concerns including Medication Refill. Patient is a 26 y.o. female that comes today for follow up of Diabetes Mellitus Type 2. Patient does not regularly monitor  their FSBG at home. The fasting plasma glucose (fpg) is unknown. not attempting to follow a low fat, low cholesterol diet  Patients activity level: sedentery  gained, 10 pounds lbs. .  The patient has not experienced recent hypoglycemia. reports that  has never smoked.  she has never used smokeless tobacco.    Lab Results   Component Value Date/Time    Hemoglobin A1c 6.9 (H) 04/13/2018 04:42 PM     Hypertension  Patient is here for follow-up of hypertension. She indicates that she is feeling well and denies any symptoms referable to her hypertension, including chest pain, palpitations, dyspnea, orthopnea, or peripheral edema. Patient has these side effects of medication: none. She is not exercising and is not adherent to low salt diet. Blood pressure is not well controlled at home. Use of agents associated with hypertension: none. History of renal disease: no.  Cardiovascular risk factors: diabetes mellitus, obesity, sedentary life style, hypertension. Never started HCTZ. She is up to date on routine pap testing which was normal. She is seen by  [facility]. She is up to date on routine eye exams with Dr. Paige Ferguson with [facility] ophthalmology. Current Outpatient Medications   Medication Sig Dispense Refill    metFORMIN ER (GLUCOPHAGE XR) 500 mg tablet Take 2 Tabs by mouth daily (with breakfast). (Patient taking differently: Take 500 mg by mouth daily (with breakfast). ) 60 Tab 3    hydroCHLOROthiazide (HYDRODIURIL) 25 mg tablet TAKE 1 TABLET BY MOUTH DAILY. 30 Tab 1       Allergies   Allergen Reactions    Latex Rash    Cephalexin (Bulk) Unknown (comments)       ROS:   Review of Systems   Constitutional: Negative for malaise/fatigue. Eyes: Negative for blurred vision. Respiratory: Negative for shortness of breath. Cardiovascular: Negative for chest pain. Objective:     Visit Vitals  BP (!) 149/93   Pulse 86   Temp 98 °F (36.7 °C) (Oral)   Resp 16   Ht 5' 6\" (1.676 m)   Wt 330 lb (149.7 kg)   SpO2 99%   BMI 53.26 kg/m²       Vitals and Nurse Documentation reviewed. Physical Exam   Constitutional: No distress. Cardiovascular: S1 normal and S2 normal. Exam reveals no gallop and no friction rub. No murmur heard. Pulmonary/Chest: Breath sounds normal. No respiratory distress. Skin: Skin is warm and dry.    Psychiatric: Mood and affect normal.

## 2019-01-28 NOTE — PATIENT INSTRUCTIONS
Hemoglobin A1c: About This Test  What is it? Hemoglobin A1c is a blood test that checks your average blood sugar level over the past 2 to 3 months. This test also is called a glycohemoglobin test or an A1c test.  Why is this test done? The A1c test is done to check how well your diabetes has been controlled over the past 2 to 3 months. Your doctor can use this information to adjust your medicine and diabetes treatment, if needed. This test is also one of the tests used to diagnose diabetes in adults. How can you prepare for the test?  You don't need to stop eating before you have an A1c test. This test can be done at any time during the day, even after a meal.  What happens during the test?  The health professional taking a sample of your blood will:  · Wrap an elastic band around your upper arm. This makes the veins below the band larger so it is easier to put a needle into the vein. · Clean the needle site with alcohol. · Put the needle into the vein. · Attach a tube to the needle to fill it with blood. · Remove the band from your arm when enough blood is collected. · Put a gauze pad or cotton ball over the needle site as the needle is removed. · Put pressure on the site and then put on a bandage. What else should you know about the test?  The test result is usually given as a percentage. The normal A1c is less than 5.7%. You have a higher risk for diabetes if your A1c is 5.7% to 6.4%. If your level is 6.5% or higher, you have diabetes. The A1c test result also can be used to find your estimated average glucose, or eAG. Your eAG and A1c show the same thing in two different ways. They both help you learn more about your average blood sugar range over the past 2 to 3 months. A1c is shown as a percentage, while eAG uses the same units (mg/dl) as your glucose meter.   Examples:  · 6% A1c = 126 mg/dL  · 7% A1c = 154 mg/dL  · 8% A1c = 183 mg/dL  · 9% A1c = 212 mg/dL  · 10% A1c = 240 mg/dL  · 11% A1c = 269 mg/dL  · 12% A1c = 298 mg/dL  Where can you learn more? Go to http://kerry-faustino.info/. Enter U216 in the search box to learn more about \"Hemoglobin A1c: About This Test.\"  Current as of: July 25, 2018  Content Version: 11.9  © 8037-0809 [employer]. Care instructions adapted under license by [employer] (which disclaims liability or warranty for this information). If you have questions about a medical condition or this instruction, always ask your healthcare professional. [address] any warranty or liability for your use of this information.

## 2019-01-28 NOTE — PROGRESS NOTES
Chief Complaint   Patient presents with    Medication Refill     1. Have you been to the ER, urgent care clinic since your last visit? Hospitalized since your last visit? Patient was seen at [facility] ([place], [place]) on 01/27/18 for stomach pain. 2. Have you seen or consulted any other health care providers outside of the [address] [place] [address] since your last visit? Include any pap smears or colon screening.  No

## 2019-01-29 LAB
ALBUMIN SERPL-MCNC: 4 G/DL (ref 3.5–5.5)
ALBUMIN/GLOB SERPL: 1 {RATIO} (ref 1.2–2.2)
ALP SERPL-CCNC: 154 IU/L (ref 39–117)
ALT SERPL-CCNC: 13 IU/L (ref 0–32)
AST SERPL-CCNC: 12 IU/L (ref 0–40)
BILIRUB SERPL-MCNC: 0.3 MG/DL (ref 0–1.2)
BUN SERPL-MCNC: 12 MG/DL (ref 6–20)
BUN/CREAT SERPL: 15 (ref 9–23)
CALCIUM SERPL-MCNC: 9.7 MG/DL (ref 8.7–10.2)
CHLORIDE SERPL-SCNC: 99 MMOL/L (ref 96–106)
CO2 SERPL-SCNC: 24 MMOL/L (ref 20–29)
CREAT SERPL-MCNC: 0.8 MG/DL (ref 0.57–1)
EST. AVERAGE GLUCOSE BLD GHB EST-MCNC: 177 MG/DL
GLOBULIN SER CALC-MCNC: 4 G/DL (ref 1.5–4.5)
GLUCOSE SERPL-MCNC: 145 MG/DL (ref 65–99)
HBA1C MFR BLD: 7.8 % (ref 4.8–5.6)
POTASSIUM SERPL-SCNC: 4.6 MMOL/L (ref 3.5–5.2)
PROT SERPL-MCNC: 8 G/DL (ref 6–8.5)
SODIUM SERPL-SCNC: 137 MMOL/L (ref 134–144)

## 2019-02-14 DIAGNOSIS — E11.9 CONTROLLED TYPE 2 DIABETES MELLITUS WITHOUT COMPLICATION, WITHOUT LONG-TERM CURRENT USE OF INSULIN (HCC): ICD-10-CM

## 2019-02-14 NOTE — TELEPHONE ENCOUNTER
Patient is requesting a refill for 90 day supply, she states that on the bottle it says take 2 but Malia told her on email to take 4, she is requesting anew RX to be sent to her pharmacy  . Requested Prescriptions     Pending Prescriptions Disp Refills    metFORMIN ER (GLUCOPHAGE XR) 500 mg tablet 60 Tab 3     Sig: Take 2 Tabs by mouth daily (with breakfast).        LOV: January 28, 2019  Last refill:5/11/2018  Pharmacy verified

## 2019-02-15 RX ORDER — METFORMIN HYDROCHLORIDE 500 MG/1
500 TABLET, EXTENDED RELEASE ORAL 2 TIMES DAILY
Qty: 60 TAB | Refills: 3 | Status: SHIPPED | OUTPATIENT
Start: 2019-02-15

## 2019-09-03 ENCOUNTER — TELEPHONE (OUTPATIENT)
Dept: FAMILY MEDICINE CLINIC | Age: 34
End: 2019-09-03

## 2019-09-03 NOTE — LETTER
9/4/2019 9:04 AM 
 
Ms. Dilia Schneider [address] [place] [address] [facility] Lab Results Component Value Date/Time Sodium 137 01/28/2019 10:34 AM  
 Potassium 4.6 01/28/2019 10:34 AM  
 Chloride 99 01/28/2019 10:34 AM  
 CO2 24 01/28/2019 10:34 AM  
 Glucose 145 (H) 01/28/2019 10:34 AM  
 BUN 12 01/28/2019 10:34 AM  
 Creatinine 0.80 01/28/2019 10:34 AM  
 BUN/Creatinine ratio 15 01/28/2019 10:34 AM  
 GFR est  01/28/2019 10:34 AM  
 GFR est non-AA 96 01/28/2019 10:34 AM  
 Calcium 9.7 01/28/2019 10:34 AM  
 Bilirubin, total 0.3 01/28/2019 10:34 AM  
 AST (SGOT) 12 01/28/2019 10:34 AM  
 Alk. phosphatase 154 (H) 01/28/2019 10:34 AM  
 Protein, total 8.0 01/28/2019 10:34 AM  
 Albumin 4.0 01/28/2019 10:34 AM  
 A-G Ratio 1.0 (L) 01/28/2019 10:34 AM  
 ALT (SGPT) 13 01/28/2019 10:34 AM  
 
Lab Results Component Value Date/Time Hemoglobin A1c 7.8 (H) 01/28/2019 10:34 AM  
 
 
Recommendations: Your A1C, or diabetes test, is worse and shows your diabetes is poorly controlled.  Please go back up to two tablets of metformin.  You can take them a separate times of the day (one in morning and one at night).  Call me back in two weeks and let me know how it is going.  I would like to get you up to 4 a day if you can tolerate it.  Otherwise, we may have to add another medication. The rest of your labs are normal. 
 
 
 
 
Sincerely, Ulysses Crosby, NP

## 2019-09-03 NOTE — TELEPHONE ENCOUNTER
Pt is calling to request to have her lab results faxed over to her.         Best contact # [phone]  Fax #[phone]          LOV 01/28/19

## 2019-09-04 NOTE — TELEPHONE ENCOUNTER
Lab results faxed on 09/04/2019 @ 9:07 am to number listed. Confirmation received.     Lab results faxed at patients request.

## 2020-02-13 ENCOUNTER — HOSPITAL ENCOUNTER (EMERGENCY)
Age: 35
Discharge: HOME | End: 2020-02-13
Attending: EMERGENCY MEDICINE
Payer: MEDICAID

## 2020-02-13 VITALS
SYSTOLIC BLOOD PRESSURE: 165 MMHG | TEMPERATURE: 98.1 F | RESPIRATION RATE: 17 BRPM | HEART RATE: 87 BPM | DIASTOLIC BLOOD PRESSURE: 91 MMHG | OXYGEN SATURATION: 99 %

## 2020-02-13 DIAGNOSIS — L02.91 ABSCESS: Primary | ICD-10-CM

## 2020-02-13 PROCEDURE — 75810000289 HC I&D ABSCESS SIMP/COMP/MULT

## 2020-02-13 PROCEDURE — 99283 EMERGENCY DEPT VISIT LOW MDM: CPT

## 2020-02-13 PROCEDURE — 74011250637 HC RX REV CODE- 250/637: Performed by: EMERGENCY MEDICINE

## 2020-02-13 PROCEDURE — 74011000250 HC RX REV CODE- 250: Performed by: EMERGENCY MEDICINE

## 2020-02-13 RX ORDER — HYDROCODONE BITARTRATE AND ACETAMINOPHEN 5; 325 MG/1; MG/1
1 TABLET ORAL
Qty: 8 TAB | Refills: 0 | Status: SHIPPED | OUTPATIENT
Start: 2020-02-13 | End: 2020-02-15

## 2020-02-13 RX ORDER — SULFAMETHOXAZOLE AND TRIMETHOPRIM 800; 160 MG/1; MG/1
2 TABLET ORAL
Status: COMPLETED | OUTPATIENT
Start: 2020-02-13 | End: 2020-02-13

## 2020-02-13 RX ORDER — LIDOCAINE HYDROCHLORIDE AND EPINEPHRINE 10; 10 MG/ML; UG/ML
3 INJECTION, SOLUTION INFILTRATION; PERINEURAL ONCE
Status: COMPLETED | OUTPATIENT
Start: 2020-02-13 | End: 2020-02-13

## 2020-02-13 RX ORDER — SULFAMETHOXAZOLE AND TRIMETHOPRIM 800; 160 MG/1; MG/1
2 TABLET ORAL 2 TIMES DAILY
Qty: 40 TAB | Refills: 0 | Status: SHIPPED | OUTPATIENT
Start: 2020-02-13 | End: 2020-02-23

## 2020-02-13 RX ADMIN — LIDOCAINE HYDROCHLORIDE AND EPINEPHRINE 30 MG: 10; 10 INJECTION, SOLUTION INFILTRATION; PERINEURAL at 20:51

## 2020-02-13 RX ADMIN — SULFAMETHOXAZOLE AND TRIMETHOPRIM 2 TABLET: 800; 160 TABLET ORAL at 20:51

## 2020-02-13 NOTE — ED TRIAGE NOTES
Patient presents from home with complaints of boil to her left breast for the last week.  Patient reports this a recurrent problem for her

## 2020-02-14 NOTE — ED PROVIDER NOTES
23-year-old female presents to the emergency room for evaluation of a boil. Patient reports over the past 2 weeks she has a boil to the left breast.  Started small progressively gotten bigger. She is tried Aleve and boil use with no relief. No fevers or chills. No nausea or vomiting. No chest pain or shortness of breath history of similar in the past.  No known precipitating events. The history is provided by the patient. No  was used. Skin Problem           Past Medical History:   Diagnosis Date    Head injury     Head injury with laceration at age 7        History reviewed. No pertinent surgical history.       Family History:   Problem Relation Age of Onset    Diabetes Maternal Grandmother     Diabetes Maternal Uncle        Social History     Socioeconomic History    Marital status: SINGLE     Spouse name: Not on file    Number of children: Not on file    Years of education: Not on file    Highest education level: Not on file   Occupational History    Not on file   Social Needs    Financial resource strain: Not on file    Food insecurity:     Worry: Not on file     Inability: Not on file    Transportation needs:     Medical: Not on file     Non-medical: Not on file   Tobacco Use    Smoking status: Never Smoker    Smokeless tobacco: Never Used   Substance and Sexual Activity    Alcohol use: No    Drug use: No    Sexual activity: Not on file   Lifestyle    Physical activity:     Days per week: Not on file     Minutes per session: Not on file    Stress: Not on file   Relationships    Social connections:     Talks on phone: Not on file     Gets together: Not on file     Attends Quaker service: Not on file     Active member of club or organization: Not on file     Attends meetings of clubs or organizations: Not on file     Relationship status: Not on file    Intimate partner violence:     Fear of current or ex partner: Not on file     Emotionally abused: Not on file Physically abused: Not on file     Forced sexual activity: Not on file   Other Topics Concern    Not on file   Social History Narrative    Not on file         ALLERGIES: Latex and Cephalexin (bulk)    Review of Systems   Constitutional: Negative for chills and fever. Respiratory: Negative for cough and chest tightness. Cardiovascular: Negative for chest pain. Gastrointestinal: Negative for abdominal pain, diarrhea and rectal pain. Musculoskeletal: Negative for back pain, myalgias and neck pain. Vitals:    02/13/20 1830   BP: (!) 192/93   Pulse: 94   Resp: 18   Temp: 97.9 °F (36.6 °C)   SpO2: 98%            Physical Exam  Vitals signs and nursing note reviewed. HENT:      Head: Normocephalic and atraumatic. Nose: Nose normal.   Cardiovascular:      Rate and Rhythm: Normal rate and regular rhythm. Pulmonary:      Comments: Left breast medial aspect: golf ball sized abscess. Soft and fluctuant  Skin:     General: Skin is warm and dry. Neurological:      General: No focal deficit present. Mental Status: She is alert and oriented to person, place, and time. Psychiatric:         Mood and Affect: Mood normal.         Thought Content: Thought content normal.          MDM  Number of Diagnoses or Management Options  Abscess:   Diagnosis management comments: 40-year-old female presenting to the emergency room for left breast abscess. Patient has a golf ball sized abscess to the left breast.  Patient is afebrile nontoxic appearing. No overlying cellulitis. Plan: Incision and drainage and antibiotics    10:30 PM  Patient tolerated incision and drainage without difficulty. A very large amount of pus was drained. Wound was irrigated. Patient has received a dose of Bactrim. Patient is well hydrated, well appearing, and in no respiratory distress. Physical exam is reassuring, and without signs of serious illness.   .  Will discharge patient home with bactrim and supportive care, and follow-up with PCP within the next few days. Standard narcotic and sedating medication warnings given  Patient's results have been reviewed with them. Patient and/or family have verbally conveyed their understanding and agreement of the patient's signs, symptoms, diagnosis, treatment and prognosis and additionally agree to follow up as recommended or return to the Emergency Room should their condition change prior to follow-up. Discharge instructions have also been provided to the patient with some educational information regarding their diagnosis as well a list of reasons why they would want to return to the ER prior to their follow-up appointment should their condition change. Amount and/or Complexity of Data Reviewed  Discuss the patient with other providers: yes (ER attending-[facility])    Patient Progress  Patient progress: stable         I&D Abcess Complex  Date/Time: 2/13/2020 10:39 PM  Performed by: Valentine Rosenthal PA-C  Authorized by: Valentine Rosenthal PA-C     Consent:     Consent obtained:  Verbal    Consent given by:  Patient    Risks discussed:  Bleeding, infection, pain and incomplete drainage    Alternatives discussed:  Referral  Location:     Type:  Abscess    Size:  3 cm    Location:  Trunk    Trunk location:  L breast  Pre-procedure details:     Skin preparation:  Betadine  Anesthesia (see MAR for exact dosages): Anesthesia method:  Local infiltration    Local anesthetic:  Lidocaine 1% WITH epi  Procedure type:     Complexity:  Complex  Procedure details:     Needle aspiration: no      Incision types:  Single straight    Incision depth:  Dermal    Scalpel blade:  11    Wound management:  Irrigated with saline and extensive cleaning    Drainage:  Purulent    Drainage amount:  Copious    Wound treatment:  Wound left open    Packing materials:  None  Post-procedure details:     Patient tolerance of procedure:   Tolerated well, no immediate complications          Pt case including HPI, PE, and all available lab and radiology results has been discussed with attending physician. Opportunity to evaluate patient has been provided to ER attending.   Discharge and prescription plan has been agreed upon.      '

## 2020-02-14 NOTE — DISCHARGE INSTRUCTIONS
Bactrim DS:2 pills every 12 hours for 10 days. Norco:1 pill every 6 hours as needed for pain. Be aware of sedating effects. No alcohol or driving with this medicine. Use warm moist compresses on your wound for 20 minutes 4-5 times a day for next 48 hours. Return to ER for any fever, chills, increased redness, warmth or swelling. REEVALUATION IN 2 DAYS. Skin Abscess: Care Instructions  Your Care Instructions    A skin abscess is a bacterial infection that forms a pocket of pus. A boil is a kind of skin abscess. The doctor may have cut an opening in the abscess so that the pus can drain out. You may have gauze in the cut so that the abscess will stay open and keep draining. You may need antibiotics. You will need to follow up with your doctor to make sure the infection has gone away. The doctor has checked you carefully, but problems can develop later. If you notice any problems or new symptoms, get medical treatment right away. Follow-up care is a key part of your treatment and safety. Be sure to make and go to all appointments, and call your doctor if you are having problems. It's also a good idea to know your test results and keep a list of the medicines you take. How can you care for yourself at home? · Apply warm and dry compresses, a heating pad set on low, or a hot water bottle 3 or 4 times a day for pain. Keep a cloth between the heat source and your skin. · If your doctor prescribed antibiotics, take them as directed. Do not stop taking them just because you feel better. You need to take the full course of antibiotics. · Take pain medicines exactly as directed. ? If the doctor gave you a prescription medicine for pain, take it as prescribed. ? If you are not taking a prescription pain medicine, ask your doctor if you can take an over-the-counter medicine. · Keep your bandage clean and dry. Change the bandage whenever it gets wet or dirty, or at least one time a day.   · If the abscess was packed with gauze:  ? Keep follow-up appointments to have the gauze changed or removed. If the doctor instructed you to remove the gauze, follow the instructions you were given for how to remove it. ? After the gauze is removed, soak the area in warm water for 15 to 20 minutes 2 times a day, until the wound closes. When should you call for help? Call your doctor now or seek immediate medical care if:    · You have signs of worsening infection, such as:  ? Increased pain, swelling, warmth, or redness. ? Red streaks leading from the infected skin. ? Pus draining from the wound. ? A fever.    Watch closely for changes in your health, and be sure to contact your doctor if:    · You do not get better as expected. Where can you learn more? Go to http://kerry-faustino.info/. Enter J779 in the search box to learn more about \"Skin Abscess: Care Instructions. \"  Current as of: April 1, 2019  Content Version: 12.2  © 8817-9653 [employer]. Care instructions adapted under license by [employer] (which disclaims liability or warranty for this information). If you have questions about a medical condition or this instruction, always ask your healthcare professional. [place] [address] any warranty or liability for your use of this information.

## 2022-03-19 PROBLEM — I10 ESSENTIAL HYPERTENSION: Status: ACTIVE | Noted: 2018-04-18

## 2022-03-19 PROBLEM — E11.9 CONTROLLED TYPE 2 DIABETES MELLITUS WITHOUT COMPLICATION, WITHOUT LONG-TERM CURRENT USE OF INSULIN (HCC): Status: ACTIVE | Noted: 2018-04-18

## 2022-03-20 PROBLEM — E66.01 OBESITY, MORBID (HCC): Status: ACTIVE | Noted: 2018-04-13

## 2023-01-12 ENCOUNTER — HOSPITAL ENCOUNTER (EMERGENCY)
Age: 38
Discharge: HOME | End: 2023-01-13
Attending: EMERGENCY MEDICINE
Payer: MEDICAID

## 2023-01-12 DIAGNOSIS — N30.00 ACUTE CYSTITIS WITHOUT HEMATURIA: Primary | ICD-10-CM

## 2023-01-12 DIAGNOSIS — R30.0 DYSURIA: ICD-10-CM

## 2023-01-12 DIAGNOSIS — I10 HYPERTENSION, UNSPECIFIED TYPE: ICD-10-CM

## 2023-01-12 PROCEDURE — 99283 EMERGENCY DEPT VISIT LOW MDM: CPT | Performed by: EMERGENCY MEDICINE

## 2023-01-13 VITALS
SYSTOLIC BLOOD PRESSURE: 148 MMHG | HEART RATE: 103 BPM | DIASTOLIC BLOOD PRESSURE: 121 MMHG | OXYGEN SATURATION: 97 % | HEIGHT: 66 IN | WEIGHT: 286.6 LBS | BODY MASS INDEX: 46.06 KG/M2 | TEMPERATURE: 97.7 F | RESPIRATION RATE: 18 BRPM

## 2023-01-13 LAB
APPEARANCE UR: ABNORMAL
BACTERIA URNS QL MICRO: ABNORMAL /HPF
BILIRUB UR QL: NEGATIVE
CLUE CELLS VAG QL WET PREP: NORMAL
COLOR UR: ABNORMAL
EPITH CASTS URNS QL MICRO: ABNORMAL /LPF
GLUCOSE UR STRIP.AUTO-MCNC: >1000 MG/DL
HCG UR QL: NEGATIVE
HGB UR QL STRIP: ABNORMAL
HYALINE CASTS URNS QL MICRO: ABNORMAL /LPF (ref 0–2)
KETONES UR QL STRIP.AUTO: ABNORMAL MG/DL
KOH PREP SPEC: NORMAL
LEUKOCYTE ESTERASE UR QL STRIP.AUTO: NEGATIVE
NITRITE UR QL STRIP.AUTO: NEGATIVE
PH UR STRIP: 5.5 (ref 5–8)
PROT UR STRIP-MCNC: 300 MG/DL
RBC #/AREA URNS HPF: >100 /HPF (ref 0–5)
SERVICE CMNT-IMP: NORMAL
SP GR UR REFRACTOMETRY: 1.03
T VAGINALIS VAG QL WET PREP: NORMAL
UA: UC IF INDICATED,UAUC: ABNORMAL
UROBILINOGEN UR QL STRIP.AUTO: 0.2 EU/DL (ref 0.2–1)
WBC URNS QL MICRO: ABNORMAL /HPF (ref 0–4)

## 2023-01-13 PROCEDURE — 87210 SMEAR WET MOUNT SALINE/INK: CPT

## 2023-01-13 PROCEDURE — 74011250637 HC RX REV CODE- 250/637: Performed by: EMERGENCY MEDICINE

## 2023-01-13 PROCEDURE — 81025 URINE PREGNANCY TEST: CPT

## 2023-01-13 PROCEDURE — 87491 CHLMYD TRACH DNA AMP PROBE: CPT

## 2023-01-13 PROCEDURE — 81001 URINALYSIS AUTO W/SCOPE: CPT

## 2023-01-13 RX ORDER — NITROFURANTOIN 25; 75 MG/1; MG/1
100 CAPSULE ORAL
Status: COMPLETED | OUTPATIENT
Start: 2023-01-13 | End: 2023-01-13

## 2023-01-13 RX ORDER — AMLODIPINE BESYLATE 5 MG/1
5 TABLET ORAL DAILY
Qty: 30 TABLET | Refills: 0 | Status: SHIPPED | OUTPATIENT
Start: 2023-01-13 | End: 2023-02-12

## 2023-01-13 RX ORDER — FLUCONAZOLE 150 MG/1
150 TABLET ORAL DAILY
Qty: 1 TABLET | Refills: 0 | Status: SHIPPED | OUTPATIENT
Start: 2023-01-13 | End: 2023-01-14

## 2023-01-13 RX ORDER — NITROFURANTOIN 25; 75 MG/1; MG/1
100 CAPSULE ORAL 2 TIMES DAILY
Qty: 10 CAPSULE | Refills: 0 | Status: SHIPPED | OUTPATIENT
Start: 2023-01-13 | End: 2023-01-18

## 2023-01-13 RX ADMIN — NITROFURANTOIN (MONOHYDRATE/MACROCRYSTALS) 100 MG: 75; 25 CAPSULE ORAL at 02:17

## 2023-01-13 NOTE — DISCHARGE INSTRUCTIONS
It was a pleasure taking care of you in our Emergency Department today. We know that when you come to [facility], you are entrusting us with your health, comfort, and safety. Our physicians and nurses honor that trust, and truly appreciate the opportunity to care for you and your loved ones. We also value your feedback. If you receive a survey about your Emergency Department experience today, please fill it out. We care about our patients' feedback, and we listen to what you have to say.   Thank you!       --- Dr. Sofy Combs MD

## 2023-01-16 LAB
C TRACH DNA SPEC QL NAA+PROBE: ABNORMAL
N GONORRHOEA DNA SPEC QL NAA+PROBE: ABNORMAL
SAMPLE TYPE: ABNORMAL
SERVICE CMNT-IMP: ABNORMAL
SPECIMEN SOURCE: ABNORMAL

## 2023-01-18 NOTE — ED PROVIDER NOTES
[facility] EMERGENCY DEPT  EMERGENCY DEPARTMENT ENCOUNTER       Pt Name: Yasmin Archibald  MRN: [id number]  [place] 1985  Date of evaluation: 1/12/2023  Provider: Ollie Oropeza MD   PCP: Henry Diaz NP  Note Started: 3:50 AM 1/18/23     CHIEF COMPLAINT       Chief Complaint   Patient presents with    Vaginal Itching     Thinks they are allergic to certain types of toilet paper, recently switched to a new one. Has been itching and burning for \"awhile\". No burning when urinating and not going more than usual.     Hypertension     Has high blood pressure but does not take any medication. Does not like the way it makes them feel. Took the meds for about 3 months then stopped. Last took it 3 years ago. High Blood Sugar     Has diabetes was put on metformin but does not take it, did not like how much it made her go to the bathroom. Will not check her sugars and has not for 3 years because she refuses to prick her finger. Does not want finger pricked here but will allow it to  be checked in blood work. HISTORY OF PRESENT ILLNESS: 1 or more elements      History From: Patient  HPI Limitations : None     Yasmin Archibald is a 42 y.o. female with history of DM and HTN stopped taking metformin and bp meds about a year ago because \"they didn't make me feel good\", recurrent leukorrhea, recurrent vaginal yeast infections, who presents with new dysuria x 2 days. No new vaginal discharge. Diagnosed with vulvar contact dermatitis on 12/15,says this is improving. No flank pain or pelvic pain. Please see more comprehensive history below under MDM  Nursing Notes were all reviewed in real time as they are made available. Any disagreements addressed in the HPI/MDM. REVIEW OF SYSTEMS      Review of Systems   Constitutional:  Negative for fever. Eyes:  Negative for visual disturbance. Respiratory:  Negative for cough and shortness of breath. Cardiovascular:  Negative for chest pain and palpitations. Gastrointestinal:  Negative for abdominal pain, diarrhea, nausea and vomiting. Genitourinary:  Positive for dysuria. Negative for dyspareunia, flank pain, hematuria, pelvic pain and vaginal bleeding. Skin:  Negative for color change and rash. Neurological:  Negative for light-headedness, numbness and headaches. Positives and Pertinent negatives as per HPI and MDM. PAST HISTORY     Past Medical History:  Past Medical History:   Diagnosis Date    Head injury     Head injury with laceration at age 7        Past Surgical History:  No past surgical history on file. Family History:  Family History   Problem Relation Age of Onset    Diabetes Maternal Grandmother     Diabetes Maternal Uncle        Social History:  Social History     Tobacco Use    Smoking status: Never    Smokeless tobacco: Never   Substance Use Topics    Alcohol use: No    Drug use: No       Allergies: Allergies   Allergen Reactions    Latex Rash    Cephalexin (Bulk) Unknown (comments)       CURRENT MEDICATIONS      Discharge Medication List as of 1/13/2023  2:13 AM        CONTINUE these medications which have NOT CHANGED    Details   metFORMIN ER (GLUCOPHAGE XR) 500 mg tablet Take 1 Tab by mouth two (2) times a day., Normal, Disp-60 Tab, R-3      hydroCHLOROthiazide (HYDRODIURIL) 25 mg tablet TAKE 1 TABLET BY MOUTH DAILY., Normal, Disp-30 Tab, R-1             SCREENINGS               No data recorded        NIHSS -      HEARTSCORE -    PECARNHEAD -     GCS - No data recorded   Curb 65 -       PHYSICAL EXAM      ED Triage Vitals   ED Encounter Vitals Group      BP 01/12/23 2350 (!) 210/118      Pulse (Heart Rate) 01/12/23 2350 (!) 103      Resp Rate 01/12/23 2351 18      Temp 01/12/23 2351 97.7 °F (36.5 °C)      Temp src --       O2 Sat (%) 01/12/23 2350 97 %      Weight 01/12/23 2351 286 lb 9.6 oz      Height 01/12/23 2351 5' 6\"        Physical Exam  Vitals and nursing note reviewed. Constitutional:       Appearance: She is obese. She is not ill-appearing. HENT:      Nose: Nose normal.      Mouth/Throat:      Mouth: Mucous membranes are moist.   Eyes:      Extraocular Movements: Extraocular movements intact. Conjunctiva/sclera: Conjunctivae normal.   Cardiovascular:      Rate and Rhythm: Normal rate and regular rhythm. Pulses: Normal pulses. Heart sounds: Normal heart sounds. Pulmonary:      Effort: Pulmonary effort is normal.      Breath sounds: Normal breath sounds. Abdominal:      Palpations: Abdomen is soft. Tenderness: There is no abdominal tenderness. There is no right CVA tenderness or left CVA tenderness. Musculoskeletal:         General: No tenderness. Normal range of motion. Skin:     General: Skin is warm and dry. Capillary Refill: Capillary refill takes less than 2 seconds. Neurological:      General: No focal deficit present. Mental Status: She is alert and oriented to person, place, and time. DIAGNOSTIC RESULTS   LABS:     Recent Results (from the past 300 hour(s))   URINALYSIS W/ REFLEX CULTURE    Collection Time: 01/13/23 12:08 AM    Specimen: Urine   Result Value Ref Range    Color YELLOW/STRAW      Appearance CLOUDY (A) CLEAR      Specific gravity 1.030      pH (UA) 5.5 5.0 - 8.0      Protein 300 (A) NEG mg/dL    Glucose >1,000 (A) NEG mg/dL    Ketone TRACE (A) NEG mg/dL    Bilirubin Negative NEG      Blood LARGE (A) NEG      Urobilinogen 0.2 0.2 - 1.0 EU/dL    Nitrites Negative NEG      Leukocyte Esterase Negative NEG      UA:UC IF INDICATED CULTURE NOT INDICATED BY UA RESULT CNI      WBC 0-4 0 - 4 /hpf    RBC >100 (H) 0 - 5 /hpf    Epithelial cells MANY (A) FEW /lpf    Bacteria 2+ (A) NEG /hpf    Hyaline cast 0-2 0 - 2 /lpf   ANKUSH, OTHER SOURCES    Collection Time: 01/13/23 12:08 AM    Specimen: Cervix;  Other   Result Value Ref Range    Special Requests: NO SPECIAL REQUESTS      KOH NO YEAST SEEN     WET PREP    Collection Time: 01/13/23 12:08 AM    Specimen: Miscellaneous sample   Result Value Ref Range    Clue cells CLUE CELLS ABSENT      Wet prep NO TRICHOMONAS SEEN     HCG URINE, QL. - POC    Collection Time: 01/13/23 12:33 AM   Result Value Ref Range    Pregnancy test,urine (POC) Negative NEG     CHLAMYDIA/GC PCR, SWAB    Collection Time: 01/13/23 12:50 AM           RADIOLOGY:  Non-plain film images such as CT, Ultrasound and MRI are read by the radiologist. Plain radiographic images are visualized and preliminarily interpreted by the ED Provider with the below findings:       Interpretation per the Radiologist below, if available at the time of this note:     No orders to display        PROCEDURES    None     CRITICAL CARE TIME    None    [facility] and DIFFERENTIAL DIAGNOSIS/MDM   Vitals:    Vitals:    01/12/23 2350 01/12/23 2351 01/13/23 0015   BP: (!) 210/118  (!) 148/121   Pulse: (!) 103     Resp:  18    Temp:  97.7 °F (36.5 °C)    SpO2: 97%     Weight:  130 kg (286 lb 9.6 oz)    Height:  5' 6\" (1.676 m)        Pertinent Chronic Medical Conditions or Prior Surgeries: dm, htn, both poorly controlled, noncompliance with medical mgmt    Social Determinants affecting Dx or Tx: None    Records Reviewed: Nursing Notes and Old Medical Records  - Including OBGYN visit on 12/15, dr. Javon Marina, u. Copy of mdm from that visit:  1. Leukorrhea (Primary)  - POCT Wet Prep- negative for yeast, BV, trich today; reassurance provided  - Encouraged to use RePhresh or PhD after intercourse if continues to note odor; use prn  2. Irritant contact dermatitis, unspecified trigger  - Avoid scented toilet paper or products  - Can apply bacitracin or A&D ointment to buttock lesion BID until healed      CC/HPI Summary, DDx, ED Course, and Reassessment:   Patient presents with dysuria, no gross hematuria, fever or flank pain. Denies new vaginal discharge.  ;History of untreated HTN and DM, stopped medications due to unwanted side effects and has not followed up with pmd or endocrine since. Most recent visit with obgyn in mid December - normal pelvic exam, sti testing was negative. She would like her urine checked today and repeat test for STDs. Given elevated BP and history of dm, offered lab testing here today which she declines. Discussed dangers and consequences, some of which are life-threatening and may cause long-term disability, of untreated dm and htn. Verbalizes understanding. States that she is in the process of setting up an outpatient follow up with primary care and will get checked there. Does not want to get started on medications until then. In terms of dysuria, ddx considered: UTI, less likely pyelonephritis, vaginal yeast infection, BV, possibly STI. Will send urine, obtained vaginal swabs for testing. Disposition Considerations (Tests not done, Shared Decision Making, Pt Expectation of Test or Tx.):      Reassessment:  Reviewed today's results with patient. UA concerning for possible UTI, so will start abx. Ordered dose of fluconazole to take after completion of abx due to history of vaginal yeast infections, particularly after taking abx. No yeast on testing today. Rest of swabs negative. Gc pending. Discussed empiric tx with patient but she would rather wait for results given no new vaginal discharge and recent negative sti test results. I have discussed with the patient and/or caregiver my initial clinical impression which is based on an evidence-based clinical evaluation of the patient and interpretation of available results. Involved patient and/or caregiver in management, treatment options and final disposition. Patient and/or caregiver verbalizes understanding and agreement.     ED Medications Administered  Medications   nitrofurantoin (macrocrystal-monohydrate) (MACROBID) capsule 100 mg (100 mg Oral Given 1/13/23 0217)       ED Orders Placed:  Orders Placed This Encounter    ANKUSH    WET PREP    CHLAMYDIA/GC PCR, SWAB    URINALYSIS W/ REFLEX CULTURE HCG URINE, QL. - POC    amLODIPine (Norvasc) 5 mg tablet    nitrofurantoin, macrocrystal-monohydrate, (Macrobid) 100 mg capsule    fluconazole (DIFLUCAN) 150 mg tablet    nitrofurantoin (macrocrystal-monohydrate) (MACROBID) capsule 100 mg     Patient's BP remains elevated as was expected. Once again addressed issue of HTN as well as presumably uncontrolled dm. Well appearing with benign physical and in particular benign neuro exam. Specifically denies having had episodes of confusion, chest pain, hematuria, or SOB, all of which would be concerning for end-organ damage. Etiology of high bp is most likely essential hypertension and medication noncompliance. I have considered, but based on patient' s history, physical exam and my clinical assessment, I have a low suspicion for CV, AMI, heart failure, renal infarction or failure or other pathologies concerning for end-organ damage and malignant HTN. At this time, rapid and emergent lowering of patient's BP is not indicated and may even be harmful if the elevated BP is long-standing. As no end-organ damage is suspected, BP optimization can be done on an outpatient basis. She did finally consent to starting BP medications today. Would prefer to get her glucose checked and DM treatment addressed with pmd.      FINAL IMPRESSION     1. Acute cystitis without hematuria    2. Dysuria    3. Hypertension, unspecified type          DISPOSITION/PLAN     Progress note:  Patient has been reassessed and reports feeling considerably better, has normal vital signs and feels comfortable going home. I think this is reasonable as no findings today suggest a life-threatening condition. DISPOSITION: DISCHARGE  The patient's results have been reviewed with patient and available family and/or caregiver.  They verbally convey their understanding and agreement of the patient's signs, symptoms, diagnosis, treatment and prognosis and additionally agree to follow up as recommended in the discharge instructions or to return to the Emergency Department should the patient's condition change prior to their follow-up appointment. The patient and available family and/or caregiver verbally agree with the care plan and all of their questions have been answered. The discharge instructions have also been provided to the them with educational information regarding the patient's diagnosis as well a list of reasons why the patient would want to return to the ER prior to their follow-up appointment should any concerns arise, the patient's condition change or symptoms worsen. Grant Madrigal MD, Msc    PLAN:  Discharge Medication List as of 1/13/2023  2:13 AM        START taking these medications    Details   amLODIPine (Norvasc) 5 mg tablet Take 1 Tablet by mouth daily for 30 days. , Normal, Disp-30 Tablet, R-0      nitrofurantoin, macrocrystal-monohydrate, (Macrobid) 100 mg capsule Take 1 Capsule by mouth two (2) times a day for 5 days. , Normal, Disp-10 Capsule, R-0      fluconazole (DIFLUCAN) 150 mg tablet Take 1 Tablet by mouth daily for 1 day. FDA advises cautious prescribing of oral fluconazole in pregnancy. , Normal, Disp-1 Tablet, R-0           CONTINUE these medications which have NOT CHANGED    Details   metFORMIN ER (GLUCOPHAGE XR) 500 mg tablet Take 1 Tab by mouth two (2) times a day., Normal, Disp-60 Tab, R-3      hydroCHLOROthiazide (HYDRODIURIL) 25 mg tablet TAKE 1 TABLET BY MOUTH DAILY., Normal, Disp-30 Tab, R-1           2. Follow-up Information       Follow up With Specialties Details Why Contact Info    Arnie Finleyw, NP Nurse Practitioner Schedule an appointment as soon as possible for a visit  Follow up AS SOON AS POSSIBLE AND PREFERABLY EARLY NEXT WEEK for managemnt of your blood pressure and diabetes.  [address]  [phone]      primary care doctor/clinic  Schedule an appointment as soon as possible for a visit       \[facility]\"" EMERGENCY DEPT Emergency Medicine Go to  As needed [address] [place] [id number]  [phone]          3. Return to ED if worse       I am the Primary Clinician of Record. Ollie Oropeza MD (electronically signed)    (Please note that parts of this dictation were completed with voice recognition software. Quite often unanticipated grammatical, syntax, homophones, and other interpretive errors are inadvertently transcribed by the computer software. Please disregards these errors.  Please excuse any errors that have escaped final proofreading.)

## 2024-01-13 ENCOUNTER — HOSPITAL ENCOUNTER (EMERGENCY)
Facility: HOSPITAL | Age: 39
Discharge: HOME | End: 2024-01-13
Payer: COMMERCIAL

## 2024-01-13 VITALS
DIASTOLIC BLOOD PRESSURE: 102 MMHG | WEIGHT: 259.48 LBS | RESPIRATION RATE: 16 BRPM | HEART RATE: 95 BPM | TEMPERATURE: 98.2 F | SYSTOLIC BLOOD PRESSURE: 131 MMHG | OXYGEN SATURATION: 99 % | BODY MASS INDEX: 41.88 KG/M2

## 2024-01-13 DIAGNOSIS — S01.81XA FACIAL LACERATION, INITIAL ENCOUNTER: Primary | ICD-10-CM

## 2024-01-13 DIAGNOSIS — Z23 NEED FOR PROPHYLACTIC VACCINATION AGAINST DIPHTHERIA AND TETANUS: ICD-10-CM

## 2024-01-13 DIAGNOSIS — S01.85XA DOG BITE OF FACE, INITIAL ENCOUNTER: ICD-10-CM
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
## 2024-01-13 PROCEDURE — 6360000002 HC RX W HCPCS: Performed by: PHYSICIAN ASSISTANT

## 2024-01-13 PROCEDURE — 99284 EMERGENCY DEPT VISIT MOD MDM: CPT

## 2024-01-13 PROCEDURE — 6370000000 HC RX 637 (ALT 250 FOR IP): Performed by: PHYSICIAN ASSISTANT

## 2024-01-13 PROCEDURE — 90471 IMMUNIZATION ADMIN: CPT | Performed by: PHYSICIAN ASSISTANT

## 2024-01-13 PROCEDURE — 90715 TDAP VACCINE 7 YRS/> IM: CPT | Performed by: PHYSICIAN ASSISTANT

## 2024-01-13 RX ORDER — GINSENG 100 MG
CAPSULE ORAL
Qty: 14 G | Refills: 0 | Status: SHIPPED | OUTPATIENT
Start: 2024-01-13

## 2024-01-13 RX ORDER — IBUPROFEN 600 MG/1
600 TABLET ORAL EVERY 8 HOURS PRN
Qty: 20 TABLET | Refills: 0 | Status: SHIPPED | OUTPATIENT
Start: 2024-01-13

## 2024-01-13 RX ORDER — AMOXICILLIN AND CLAVULANATE POTASSIUM 875; 125 MG/1; MG/1
1 TABLET, FILM COATED ORAL 2 TIMES DAILY
Qty: 14 TABLET | Refills: 0 | Status: SHIPPED | OUTPATIENT
Start: 2024-01-13 | End: 2024-01-20

## 2024-01-13 RX ORDER — AMOXICILLIN AND CLAVULANATE POTASSIUM 875; 125 MG/1; MG/1
1 TABLET, FILM COATED ORAL
Status: COMPLETED | OUTPATIENT
Start: 2024-01-13 | End: 2024-01-13

## 2024-01-13 RX ADMIN — AMOXICILLIN AND CLAVULANATE POTASSIUM 1 TABLET: 875; 125 TABLET, FILM COATED ORAL at 11:28

## 2024-01-13 RX ADMIN — TETANUS TOXOID, REDUCED DIPHTHERIA TOXOID AND ACELLULAR PERTUSSIS VACCINE, ADSORBED 0.5 ML: 5; 2.5; 8; 8; 2.5 SUSPENSION INTRAMUSCULAR at 11:27

## 2024-01-13 ASSESSMENT — VISUAL ACUITY: OU: 1

## 2024-01-13 ASSESSMENT — PAIN SCALES - GENERAL: PAINLEVEL_OUTOF10: 0

## 2024-01-13 NOTE — ED PROVIDER NOTES
MOUTH DAILY.    METFORMIN (GLUCOPHAGE-XR) 500 MG EXTENDED RELEASE TABLET    Take by mouth 2 times daily       SCREENINGS               No data recorded        PHYSICAL EXAM      ED Triage Vitals [01/13/24 0949]   Enc Vitals Group      BP (!) 131/102      Pulse 95      Respirations 16      Temp 98.2 °F (36.8 °C)      Temp Source Oral      SpO2 99 %      Weight - Scale 117.7 kg (259 lb 7.7 oz)      Height       Head Circumference       Peak Flow       Pain Score       Pain Loc       Pain Edu?       Excl. in GC?         Physical Exam  Vitals and nursing note reviewed.   Constitutional:       General: She is not in acute distress.     Appearance: She is obese.   HENT:      Head: Normocephalic.      Nose: Laceration present.        Comments:   ~1cm linear laceration medial superior to and just right of midline of the bridge of the nose. No involvement of the eyelid or medial canthus.      Mouth/Throat:      Mouth: Mucous membranes are moist.   Eyes:      General: Vision grossly intact.   Cardiovascular:      Rate and Rhythm: Normal rate.   Pulmonary:      Effort: Pulmonary effort is normal.   Abdominal:      Palpations: Abdomen is soft.   Neurological:      Mental Status: She is alert and oriented to person, place, and time.   Psychiatric:         Mood and Affect: Mood normal.          DIAGNOSTIC RESULTS   LABS:     No results found for this or any previous visit (from the past 24 hour(s)).      EKG: When ordered, EKG's are interpreted by the Emergency Department Physician in the absence of a cardiologist.  Please see their note for interpretation of EKG.      RADIOLOGY:  Non-plain film images such as CT, Ultrasound and MRI are read by the radiologist. Plain radiographic images are visualized and preliminarily interpreted by the ED Provider with the below findings:        Interpretation per the Radiologist below, if available at the time of this note:     No results found.     PROCEDURES   Unless otherwise noted below,  irrigated with water taking care not is pressurized.  Bleeding is well-controlled and I to place 2 small Steri-Strip pieces perpendicularly oriented to the laceration with good approximation.    Patient is given a dose of Augmentin.  We update her tetanus.  She tells me she is very concerned about her scar which is understandable.  I do prescribe a continuing course of Augmentin that she is instructed to start today.  Bacitracin ointment is prescribed.  Ibuprofen is prescribed for pain.  I do provide information regarding plastic surgery if the scar is too unsightly.  Return precautions for redness, swelling, pain, fever or any concerns.       Disposition Considerations (Tests not done, Shared Decision Making, Pt Expectation of Test or Tx.):      FINAL IMPRESSION     1. Facial laceration, initial encounter    2. Dog bite of face, initial encounter    3. Need for prophylactic vaccination against diphtheria and tetanus          DISPOSITION/PLAN   DISPOSITION Decision To Discharge 01/13/2024 11:35:28 AM    Discharge Note: The patient is stable for discharge home. The signs, symptoms, diagnosis, and discharge instructions have been discussed, understanding conveyed, and agreed upon. The patient is to follow up as recommended or return to ER should their symptoms worsen.      PATIENT REFERRED TO:  Jose Eduardo Cho MD  [address] [facility] [address] [place] [address] Plastic Surgery [facility] [address] [phone]    Call   PLASTIC SURGERY: as needed regarding your scar / dog bite       DISCHARGE MEDICATIONS:     Medication List        START taking these medications      amoxicillin-clavulanate 875-125 MG per tablet  Commonly known as: AUGMENTIN  Take 1 tablet by mouth 2 times daily for 7 days     bacitracin 500 UNIT/GM ointment  Apply topically 2 times daily.     ibuprofen 600 MG tablet  Commonly known as: ADVIL;MOTRIN  Take 1 tablet by mouth every 8 hours as needed for Pain            ASK your doctor about these

## 2024-01-13 NOTE — ED NOTES
KIA Alvarado reviewed discharge instructions with the patient at this time. The Patient verbalized understanding and denies any further questions. Patient has been made aware of prescription(s) called into pharmacy for . Patient ambulatory out to waiting room at this time.

## 2024-07-26 ENCOUNTER — APPOINTMENT (OUTPATIENT)
Facility: HOSPITAL | Age: 39
DRG: 465 | End: 2024-07-26
Payer: COMMERCIAL

## 2024-07-26 ENCOUNTER — HOSPITAL ENCOUNTER (INPATIENT)
Facility: HOSPITAL | Age: 39
LOS: 1 days | Discharge: HOME | DRG: 465 | End: 2024-07-27
Attending: EMERGENCY MEDICINE | Admitting: INTERNAL MEDICINE
Payer: COMMERCIAL

## 2024-07-26 DIAGNOSIS — N13.30 HYDRONEPHROSIS, UNSPECIFIED HYDRONEPHROSIS TYPE: ICD-10-CM

## 2024-07-26 DIAGNOSIS — I10 ESSENTIAL HYPERTENSION: ICD-10-CM

## 2024-07-26 DIAGNOSIS — N20.0 STAGHORN CALCULUS: Primary | ICD-10-CM

## 2024-07-26 PROBLEM — R10.9 ABDOMINAL PAIN: Status: ACTIVE | Noted: 2024-07-26

## 2024-07-26 LAB
ALBUMIN SERPL-MCNC: 3 G/DL (ref 3.5–5)
ALBUMIN/GLOB SERPL: 0.5 (ref 1.1–2.2)
ALP SERPL-CCNC: 162 U/L (ref 45–117)
ALT SERPL-CCNC: 17 U/L (ref 12–78)
ANION GAP SERPL CALC-SCNC: 5 MMOL/L (ref 5–15)
APPEARANCE UR: ABNORMAL
AST SERPL-CCNC: ABNORMAL U/L (ref 15–37)
BACTERIA URNS QL MICRO: ABNORMAL /HPF
BASOPHILS # BLD: 0 K/UL (ref 0–0.1)
BASOPHILS NFR BLD: 1 % (ref 0–1)
BILIRUB SERPL-MCNC: 0.9 MG/DL (ref 0.2–1)
BILIRUB UR QL: NEGATIVE
BUN SERPL-MCNC: 15 MG/DL (ref 6–20)
BUN/CREAT SERPL: 11 (ref 12–20)
CALCIUM SERPL-MCNC: 9.1 MG/DL (ref 8.5–10.1)
CHLORIDE SERPL-SCNC: 104 MMOL/L (ref 97–108)
CO2 SERPL-SCNC: 25 MMOL/L (ref 21–32)
COLOR UR: ABNORMAL
CREAT SERPL-MCNC: 1.37 MG/DL (ref 0.55–1.02)
DIFFERENTIAL METHOD BLD: ABNORMAL
EOSINOPHIL # BLD: 0.1 K/UL (ref 0–0.4)
EOSINOPHIL NFR BLD: 1 % (ref 0–7)
EPITH CASTS URNS QL MICRO: ABNORMAL /LPF
ERYTHROCYTE [DISTWIDTH] IN BLOOD BY AUTOMATED COUNT: 16.2 % (ref 11.5–14.5)
GLOBULIN SER CALC-MCNC: 5.5 G/DL (ref 2–4)
GLUCOSE BLD STRIP.AUTO-MCNC: 112 MG/DL (ref 65–117)
GLUCOSE BLD STRIP.AUTO-MCNC: 150 MG/DL (ref 65–117)
GLUCOSE SERPL-MCNC: 127 MG/DL (ref 65–100)
GLUCOSE UR STRIP.AUTO-MCNC: NEGATIVE MG/DL
GRAN CASTS URNS QL MICRO: ABNORMAL /LPF
HCG UR QL: NEGATIVE
HCT VFR BLD AUTO: 39.3 % (ref 35–47)
HGB BLD-MCNC: 12.2 G/DL (ref 11.5–16)
HGB UR QL STRIP: ABNORMAL
HYALINE CASTS URNS QL MICRO: ABNORMAL /LPF (ref 0–5)
IMM GRANULOCYTES # BLD AUTO: 0 K/UL (ref 0–0.04)
IMM GRANULOCYTES NFR BLD AUTO: 0 % (ref 0–0.5)
KETONES UR QL STRIP.AUTO: ABNORMAL MG/DL
LEUKOCYTE ESTERASE UR QL STRIP.AUTO: ABNORMAL
LIPASE SERPL-CCNC: 19 U/L (ref 13–75)
LYMPHOCYTES # BLD: 2.1 K/UL (ref 0.8–3.5)
LYMPHOCYTES NFR BLD: 33 % (ref 12–49)
MCH RBC QN AUTO: 25.1 PG (ref 26–34)
MCHC RBC AUTO-ENTMCNC: 31 G/DL (ref 30–36.5)
MCV RBC AUTO: 80.7 FL (ref 80–99)
MONOCYTES # BLD: 0.3 K/UL (ref 0–1)
MONOCYTES NFR BLD: 5 % (ref 5–13)
NEUTS SEG # BLD: 3.8 K/UL (ref 1.8–8)
NEUTS SEG NFR BLD: 60 % (ref 32–75)
NITRITE UR QL STRIP.AUTO: NEGATIVE
NRBC # BLD: 0 K/UL (ref 0–0.01)
NRBC BLD-RTO: 0 PER 100 WBC
PH UR STRIP: 5.5 (ref 5–8)
PLATELET # BLD AUTO: 267 K/UL (ref 150–400)
PMV BLD AUTO: 10.9 FL (ref 8.9–12.9)
POTASSIUM SERPL-SCNC: 3.6 MMOL/L (ref 3.5–5.1)
POTASSIUM SERPL-SCNC: ABNORMAL MMOL/L (ref 3.5–5.1)
PROT SERPL-MCNC: 8.5 G/DL (ref 6.4–8.2)
PROT UR STRIP-MCNC: 30 MG/DL
RBC # BLD AUTO: 4.87 M/UL (ref 3.8–5.2)
RBC #/AREA URNS HPF: ABNORMAL /HPF (ref 0–5)
SERVICE CMNT-IMP: ABNORMAL
SERVICE CMNT-IMP: NORMAL
SODIUM SERPL-SCNC: 134 MMOL/L (ref 136–145)
SP GR UR REFRACTOMETRY: 1.02
URINE CULTURE IF INDICATED: ABNORMAL
UROBILINOGEN UR QL STRIP.AUTO: 0.2 EU/DL (ref 0.2–1)
WBC # BLD AUTO: 6.3 K/UL (ref 3.6–11)
WBC URNS QL MICRO: ABNORMAL /HPF (ref 0–4)

## 2024-07-26 PROCEDURE — 84132 ASSAY OF SERUM POTASSIUM: CPT

## 2024-07-26 PROCEDURE — 74177 CT ABD & PELVIS W/CONTRAST: CPT

## 2024-07-26 PROCEDURE — G0378 HOSPITAL OBSERVATION PER HR: HCPCS

## 2024-07-26 PROCEDURE — 85025 COMPLETE CBC W/AUTO DIFF WBC: CPT

## 2024-07-26 PROCEDURE — 36415 COLL VENOUS BLD VENIPUNCTURE: CPT

## 2024-07-26 PROCEDURE — 2580000003 HC RX 258: Performed by: INTERNAL MEDICINE

## 2024-07-26 PROCEDURE — 81025 URINE PREGNANCY TEST: CPT

## 2024-07-26 PROCEDURE — 6370000000 HC RX 637 (ALT 250 FOR IP): Performed by: STUDENT IN AN ORGANIZED HEALTH CARE EDUCATION/TRAINING PROGRAM

## 2024-07-26 PROCEDURE — 87086 URINE CULTURE/COLONY COUNT: CPT

## 2024-07-26 PROCEDURE — 83690 ASSAY OF LIPASE: CPT

## 2024-07-26 PROCEDURE — 96365 THER/PROPH/DIAG IV INF INIT: CPT

## 2024-07-26 PROCEDURE — 1100000000 HC RM PRIVATE

## 2024-07-26 PROCEDURE — 96361 HYDRATE IV INFUSION ADD-ON: CPT

## 2024-07-26 PROCEDURE — 80053 COMPREHEN METABOLIC PANEL: CPT

## 2024-07-26 PROCEDURE — 6360000002 HC RX W HCPCS: Performed by: EMERGENCY MEDICINE

## 2024-07-26 PROCEDURE — 6360000004 HC RX CONTRAST MEDICATION: Performed by: EMERGENCY MEDICINE

## 2024-07-26 PROCEDURE — 6370000000 HC RX 637 (ALT 250 FOR IP): Performed by: EMERGENCY MEDICINE

## 2024-07-26 PROCEDURE — 2580000003 HC RX 258: Performed by: EMERGENCY MEDICINE

## 2024-07-26 PROCEDURE — 6360000002 HC RX W HCPCS: Performed by: INTERNAL MEDICINE

## 2024-07-26 PROCEDURE — 82962 GLUCOSE BLOOD TEST: CPT

## 2024-07-26 PROCEDURE — 96375 TX/PRO/DX INJ NEW DRUG ADDON: CPT

## 2024-07-26 PROCEDURE — 96376 TX/PRO/DX INJ SAME DRUG ADON: CPT

## 2024-07-26 PROCEDURE — 81001 URINALYSIS AUTO W/SCOPE: CPT

## 2024-07-26 PROCEDURE — 99285 EMERGENCY DEPT VISIT HI MDM: CPT

## 2024-07-26 PROCEDURE — 2500000003 HC RX 250 WO HCPCS: Performed by: EMERGENCY MEDICINE

## 2024-07-26 PROCEDURE — 96374 THER/PROPH/DIAG INJ IV PUSH: CPT

## 2024-07-26 RX ORDER — ACETAMINOPHEN 650 MG/1
650 SUPPOSITORY RECTAL EVERY 6 HOURS PRN
Status: DISCONTINUED | OUTPATIENT
Start: 2024-07-26 | End: 2024-07-27 | Stop reason: HOSPADM

## 2024-07-26 RX ORDER — LANOLIN ALCOHOL/MO/W.PET/CERES
3 CREAM (GRAM) TOPICAL NIGHTLY PRN
Status: DISCONTINUED | OUTPATIENT
Start: 2024-07-26 | End: 2024-07-27 | Stop reason: HOSPADM

## 2024-07-26 RX ORDER — MORPHINE SULFATE 2 MG/ML
2 INJECTION, SOLUTION INTRAMUSCULAR; INTRAVENOUS EVERY 4 HOURS PRN
Status: DISCONTINUED | OUTPATIENT
Start: 2024-07-26 | End: 2024-07-27

## 2024-07-26 RX ORDER — 0.9 % SODIUM CHLORIDE 0.9 %
1000 INTRAVENOUS SOLUTION INTRAVENOUS ONCE
Status: COMPLETED | OUTPATIENT
Start: 2024-07-26 | End: 2024-07-26

## 2024-07-26 RX ORDER — ONDANSETRON 2 MG/ML
4 INJECTION INTRAMUSCULAR; INTRAVENOUS ONCE
Status: COMPLETED | OUTPATIENT
Start: 2024-07-26 | End: 2024-07-26

## 2024-07-26 RX ORDER — SODIUM CHLORIDE 9 MG/ML
INJECTION, SOLUTION INTRAVENOUS CONTINUOUS
Status: DISCONTINUED | OUTPATIENT
Start: 2024-07-26 | End: 2024-07-27

## 2024-07-26 RX ORDER — SEMAGLUTIDE 1.34 MG/ML
1 INJECTION, SOLUTION SUBCUTANEOUS
COMMUNITY

## 2024-07-26 RX ORDER — HYDROMORPHONE HYDROCHLORIDE 1 MG/ML
0.25 INJECTION, SOLUTION INTRAMUSCULAR; INTRAVENOUS; SUBCUTANEOUS
Status: COMPLETED | OUTPATIENT
Start: 2024-07-26 | End: 2024-07-26

## 2024-07-26 RX ORDER — NYSTATIN 100000 [USP'U]/G
POWDER TOPICAL AS NEEDED
COMMUNITY

## 2024-07-26 RX ORDER — LISINOPRIL 10 MG/1
10 TABLET ORAL DAILY
Status: ON HOLD | COMMUNITY
End: 2024-07-27 | Stop reason: HOSPADM

## 2024-07-26 RX ORDER — INSULIN LISPRO 100 [IU]/ML
0-4 INJECTION, SOLUTION INTRAVENOUS; SUBCUTANEOUS NIGHTLY
Status: DISCONTINUED | OUTPATIENT
Start: 2024-07-26 | End: 2024-07-27 | Stop reason: HOSPADM

## 2024-07-26 RX ORDER — KETOROLAC TROMETHAMINE 30 MG/ML
30 INJECTION, SOLUTION INTRAMUSCULAR; INTRAVENOUS
Status: COMPLETED | OUTPATIENT
Start: 2024-07-26 | End: 2024-07-26

## 2024-07-26 RX ORDER — HYDRALAZINE HYDROCHLORIDE 20 MG/ML
10 INJECTION INTRAMUSCULAR; INTRAVENOUS EVERY 6 HOURS PRN
Status: DISCONTINUED | OUTPATIENT
Start: 2024-07-26 | End: 2024-07-27 | Stop reason: HOSPADM

## 2024-07-26 RX ORDER — MORPHINE SULFATE 4 MG/ML
4 INJECTION, SOLUTION INTRAMUSCULAR; INTRAVENOUS
Status: COMPLETED | OUTPATIENT
Start: 2024-07-26 | End: 2024-07-26

## 2024-07-26 RX ORDER — ONDANSETRON 2 MG/ML
4 INJECTION INTRAMUSCULAR; INTRAVENOUS EVERY 6 HOURS PRN
Status: DISCONTINUED | OUTPATIENT
Start: 2024-07-26 | End: 2024-07-27 | Stop reason: HOSPADM

## 2024-07-26 RX ORDER — ACETAMINOPHEN 325 MG/1
650 TABLET ORAL EVERY 6 HOURS PRN
Status: DISCONTINUED | OUTPATIENT
Start: 2024-07-26 | End: 2024-07-27 | Stop reason: HOSPADM

## 2024-07-26 RX ORDER — INSULIN LISPRO 100 [IU]/ML
0-8 INJECTION, SOLUTION INTRAVENOUS; SUBCUTANEOUS
Status: DISCONTINUED | OUTPATIENT
Start: 2024-07-26 | End: 2024-07-27 | Stop reason: HOSPADM

## 2024-07-26 RX ORDER — LEVOFLOXACIN 5 MG/ML
750 INJECTION, SOLUTION INTRAVENOUS DAILY
Status: DISCONTINUED | OUTPATIENT
Start: 2024-07-26 | End: 2024-07-27 | Stop reason: HOSPADM

## 2024-07-26 RX ORDER — POLYETHYLENE GLYCOL 3350 17 G/17G
17 POWDER, FOR SOLUTION ORAL DAILY PRN
Status: DISCONTINUED | OUTPATIENT
Start: 2024-07-26 | End: 2024-07-27 | Stop reason: HOSPADM

## 2024-07-26 RX ORDER — ONDANSETRON 4 MG/1
4 TABLET, ORALLY DISINTEGRATING ORAL EVERY 8 HOURS PRN
Status: DISCONTINUED | OUTPATIENT
Start: 2024-07-26 | End: 2024-07-27 | Stop reason: HOSPADM

## 2024-07-26 RX ORDER — LISINOPRIL 5 MG/1
10 TABLET ORAL
Status: COMPLETED | OUTPATIENT
Start: 2024-07-26 | End: 2024-07-26

## 2024-07-26 RX ORDER — AMLODIPINE BESYLATE 5 MG/1
5 TABLET ORAL DAILY
Status: DISCONTINUED | OUTPATIENT
Start: 2024-07-27 | End: 2024-07-27 | Stop reason: HOSPADM

## 2024-07-26 RX ADMIN — MELATONIN 3 MG: at 23:11

## 2024-07-26 RX ADMIN — HYDRALAZINE HYDROCHLORIDE 10 MG: 20 INJECTION INTRAMUSCULAR; INTRAVENOUS at 18:51

## 2024-07-26 RX ADMIN — SODIUM CHLORIDE: 9 INJECTION, SOLUTION INTRAVENOUS at 20:27

## 2024-07-26 RX ADMIN — FAMOTIDINE 20 MG: 10 INJECTION, SOLUTION INTRAVENOUS at 12:29

## 2024-07-26 RX ADMIN — SODIUM CHLORIDE: 9 INJECTION, SOLUTION INTRAVENOUS at 16:51

## 2024-07-26 RX ADMIN — ONDANSETRON 4 MG: 2 INJECTION INTRAMUSCULAR; INTRAVENOUS at 13:09

## 2024-07-26 RX ADMIN — SODIUM CHLORIDE 1000 ML: 9 INJECTION, SOLUTION INTRAVENOUS at 12:28

## 2024-07-26 RX ADMIN — LEVOFLOXACIN 750 MG: 5 INJECTION, SOLUTION INTRAVENOUS at 17:29

## 2024-07-26 RX ADMIN — LISINOPRIL 10 MG: 5 TABLET ORAL at 15:40

## 2024-07-26 RX ADMIN — HYDROMORPHONE HYDROCHLORIDE 0.25 MG: 1 INJECTION, SOLUTION INTRAMUSCULAR; INTRAVENOUS; SUBCUTANEOUS at 14:06

## 2024-07-26 RX ADMIN — KETOROLAC TROMETHAMINE 30 MG: 30 INJECTION, SOLUTION INTRAMUSCULAR at 10:56

## 2024-07-26 RX ADMIN — ONDANSETRON 4 MG: 2 INJECTION INTRAMUSCULAR; INTRAVENOUS at 16:49

## 2024-07-26 RX ADMIN — MORPHINE SULFATE 4 MG: 4 INJECTION, SOLUTION INTRAMUSCULAR; INTRAVENOUS at 13:09

## 2024-07-26 RX ADMIN — IOPAMIDOL 100 ML: 755 INJECTION, SOLUTION INTRAVENOUS at 12:05

## 2024-07-26 ASSESSMENT — PAIN DESCRIPTION - ORIENTATION
ORIENTATION: LOWER;LEFT
ORIENTATION: LEFT;LOWER
ORIENTATION: LEFT;LOWER

## 2024-07-26 ASSESSMENT — PAIN - FUNCTIONAL ASSESSMENT
PAIN_FUNCTIONAL_ASSESSMENT: ACTIVITIES ARE NOT PREVENTED
PAIN_FUNCTIONAL_ASSESSMENT: 0-10

## 2024-07-26 ASSESSMENT — PAIN DESCRIPTION - LOCATION
LOCATION: ABDOMEN
LOCATION: ABDOMEN;VAGINA;BACK
LOCATION: ABDOMEN;BACK
LOCATION: ABDOMEN

## 2024-07-26 ASSESSMENT — PAIN SCALES - GENERAL
PAINLEVEL_OUTOF10: 7
PAINLEVEL_OUTOF10: 10
PAINLEVEL_OUTOF10: 0
PAINLEVEL_OUTOF10: 4
PAINLEVEL_OUTOF10: 10
PAINLEVEL_OUTOF10: 10

## 2024-07-26 ASSESSMENT — LIFESTYLE VARIABLES
HOW OFTEN DO YOU HAVE A DRINK CONTAINING ALCOHOL: NEVER
HOW MANY STANDARD DRINKS CONTAINING ALCOHOL DO YOU HAVE ON A TYPICAL DAY: PATIENT DOES NOT DRINK

## 2024-07-26 ASSESSMENT — PAIN DESCRIPTION - FREQUENCY: FREQUENCY: CONTINUOUS

## 2024-07-26 ASSESSMENT — PAIN DESCRIPTION - PAIN TYPE: TYPE: ACUTE PAIN

## 2024-07-26 ASSESSMENT — PAIN DESCRIPTION - DESCRIPTORS: DESCRIPTORS: PATIENT UNABLE TO DESCRIBE

## 2024-07-26 NOTE — H&P
Hospitalist Admission Note    NAME:   Kerrie Soriano   : 1985   MRN: [id number]     Date/Time: 2024 4:25 PM    Patient PCP: No primary care provider on file.    ______________________________________________________________________  Given the patient's current clinical presentation, I have a high level of concern for decompensation if discharged from the emergency department.  Complex decision making was performed, which includes reviewing the patient's available past medical records, laboratory results, and x-ray films.       My assessment of this patient's clinical condition and my plan of care is as follows.    Assessment / Plan:  Abdominal pain secondary to left-sided staghorn calculus with associated moderate to severe hydronephrosis  Postobstructive AMADA  ?  UTI    Will admit to medical floor, start IV fluid normal saline at 125 cc/h  Will hold all nephrotoxins including home dose lisinopril and  UA showed 1+ bacteria, will start ceftriaxone  Follow-up urine cultures  N.p.o. after midnight for urological procedure  CT abdomen pelvis showed     1. Staghorn calculus in the left renal pelvis measuring 3.3 x 3.1 cm, with  resultant moderate to severe left hydronephrosis. Multiple additional stones in  the lower pole of the left kidney.  Hypertension  Diabetes mellitus  Elevated blood pressure, hold lisinopril, start Norvasc.  Patient is on Ozempic 1 mg every 7 days, will hold, start sliding scale insulin, monitor for hypoglycemia due to insulin toxicity      Medical Decision Making:   I personally reviewed labs: CBC BMP  I personally reviewed imaging: CT abdomen pelvis  I personally reviewed EKG: Yes reviewed, normal sinus rhythm  Toxic drug monitoring: None  Discussed case with: ED provider. After discussion I am in agreement that acuity of patient's medical condition necessitates hospital stay.      Code Status: Full code  DVT Prophylaxis: SCDs until urological procedure  Baseline:  10.9 8.9 - 12.9 FL    Nucleated RBCs 0.0 0  WBC    nRBC 0.00 0.00 - 0.01 K/uL    Neutrophils % 60 32 - 75 %    Lymphocytes % 33 12 - 49 %    Monocytes % 5 5 - 13 %    Eosinophils % 1 0 - 7 %    Basophils % 1 0 - 1 %    Immature Granulocytes % 0 0.0 - 0.5 %    Neutrophils Absolute 3.8 1.8 - 8.0 K/UL    Lymphocytes Absolute 2.1 0.8 - 3.5 K/UL    Monocytes Absolute 0.3 0.0 - 1.0 K/UL    Eosinophils Absolute 0.1 0.0 - 0.4 K/UL    Basophils Absolute 0.0 0.0 - 0.1 K/UL    Immature Granulocytes Absolute 0.0 0.00 - 0.04 K/UL    Differential Type AUTOMATED     Comprehensive Metabolic Panel    Collection Time: 07/26/24 10:30 AM   Result Value Ref Range    Sodium 134 (L) 136 - 145 mmol/L    Potassium Hemolyzed, Recollection Recommended 3.5 - 5.1 mmol/L    Chloride 104 97 - 108 mmol/L    CO2 25 21 - 32 mmol/L    Anion Gap 5 5 - 15 mmol/L    Glucose 127 (H) 65 - 100 mg/dL    BUN 15 6 - 20 MG/DL    Creatinine 1.37 (H) 0.55 - 1.02 MG/DL    BUN/Creatinine Ratio 11 (L) 12 - 20      Est, Glom Filt Rate 50 (L) >60 ml/min/1.73m2    Calcium 9.1 8.5 - 10.1 MG/DL    Total Bilirubin 0.9 0.2 - 1.0 MG/DL    ALT 17 12 - 78 U/L    AST Hemolyzed, Recollection Recommended 15 - 37 U/L    Alk Phosphatase 162 (H) 45 - 117 U/L    Total Protein 8.5 (H) 6.4 - 8.2 g/dL    Albumin 3.0 (L) 3.5 - 5.0 g/dL    Globulin 5.5 (H) 2.0 - 4.0 g/dL    Albumin/Globulin Ratio 0.5 (L) 1.1 - 2.2     Lipase    Collection Time: 07/26/24 10:30 AM   Result Value Ref Range    Lipase 19 13 - 75 U/L   Urinalysis with Reflex to Culture “IF” dysuria, frequency, or urgency.    Collection Time: 07/26/24 11:50 AM    Specimen: Urine   Result Value Ref Range    Color, UA YELLOW/STRAW      Appearance CLOUDY (A) CLEAR      Specific Gravity, UA 1.018      pH, Urine 5.5 5.0 - 8.0      Protein, UA 30 (A) NEG mg/dL    Glucose, Ur Negative NEG mg/dL    Ketones, Urine TRACE (A) NEG mg/dL    Bilirubin, Urine Negative NEG      Blood, Urine MODERATE (A) NEG      Urobilinogen, Urine 0.2

## 2024-07-26 NOTE — ED TRIAGE NOTES
Pt presents ambulatory to ED via triage for c/o LLQ abdominal, vaginal, and lower back pain x2 weeks. Pt reports she had a yeast infection on 6/28/24 and was prescribed Fluconazole that caused an allergic reaction but pt did complete the Rx. Pt was seen by her diabetes doctor and told no UTI but there was blood in her urine. Pt was seen in ER a couple of days ago and pt was treated for UTI but diabetic doctor told her to stop Rx for UTI because she does not have a UTI.    Pt also complains of right sided neck cramping.

## 2024-07-26 NOTE — CONSULTS
Requesting Provider: Jabier Aleman MD - Reason for Consultation: \"Staghorn calculus on the left 3.3 x 3.1 cm with moderate to severe hydronephrosis\"  Pre-existing [facility] Patient:   No                Patient: Kerrie Soriano MRN: [id number]  SSN: [id number]    YOB: 1985  Age: 38 y.o.  Sex: female     Location: [place] [address]       Code Status: No Order   PCP: No primary care provider on file.  - None   Emergency Contact:  Primary Emergency Contact: Lorin Soriano, [place] Phone: [phone]   Race/Mormonism/Language: Black /  / None / Speaks English   [facility]: [facility] / Plan: [employer] [facility] / Product Type: *No Product type* /    Prior Admission Data: 1/13/24 [facility] EMERGENCY DEPT     Hospitalized:  Hospital Day: 1 - Admitted 7/26/2024 10:04 AM   POD # * No surgery found *  by * Surgery not found * - Blood Loss: * No surgery found * * Surgery not found *     CONSULTANTS  IP CONSULT TO UROLOGY   ADMISSION DIAGNOSES  [unfilled]      Assessment/Plan:       Moderate to severe left hydronephrosis 2/2 3.3 x 3.1 cm staghorn calculus in the left renal pelvis  Left kidney stones - additional to the staghorn as large as 1.6 cm  Left flank pain  Left lower quadrant abdominal pain  - No urgent urological surgical intervention at this time  - I did discuss the potential need for advanced stone treatment with process of procedure PCNL; I did explain to the patient we are not scheduling any surgery at this time  - NPO at midnight - ordered  - Urine culture ordered and sent  - Recommend overnight observation  - Intake and output  - Following; we will see Saturday, 7/27/2024 for further evaluation; blood pressures are considerably elevated at this time    D/w supervising MD, Dr. Duc De Santiago       CC: [unfilled]   HPI: She is a 38 y.o. female we are consulted to see due to findings of a staghorn calculus in her left kidney.  It is 3.3 x 3.1 cm in size.  Moderate to severe

## 2024-07-26 NOTE — ED NOTES
TRANSFER - OUT REPORT:    Verbal report given to ARCENIO Berger on Kerrie Soriano  being transferred to [facility] for routine progression of patient care       Report consisted of patient's Situation, Background, Assessment and   Recommendations(SBAR).     Information from the following report(s) Nurse Handoff Report, ED Encounter Summary, ED SBAR, MAR, and Cardiac Rhythm NSR  was reviewed with the receiving nurse.    [place] Assessment:    Presents to emergency department  because of falls (Syncope, seizure, or loss of consciousness): No  Age > 70: No  Altered Mental Status, Intoxication with alcohol or substance confusion (Disorientation, impaired judgment, poor safety awaremess, or inability to follow instructions): No  Impaired Mobility: Ambulates or transfers with assistive devices or assistance; Unable to ambulate or transer.: No  Nursing Judgement: No          Lines:   Peripheral IV 07/26/24 Left Antecubital (Active)   Line Status Brisk blood return;Normal saline locked;Specimen collected;Flushed 07/26/24 1032   Line Care Line pulled back 07/26/24 1032   Dressing Status Clean, dry & intact;New dressing applied 07/26/24 1032   Dressing Type Transparent 07/26/24 1032        Opportunity for questions and clarification was provided.      Patient transported with:  Monitor and Tech

## 2024-07-26 NOTE — ED NOTES
Attempted to obtain urine. Pt refused and would like her results read from her My Chart. Pt refused urine for pregnancy test as well as she report she hasn't had sex in a year.

## 2024-07-26 NOTE — PROGRESS NOTES
Admission Medication History Technician Note:    Hemodialysis patient: None    Patient preferred pharmacy Confirmed:    [id number] - [place], [place] - [address] - P [phone] - F [phone]  [address] [address]  Phone: [phone] Fax: [phone]      Information obtained from¹: Patient and Rx Query    Does patient manage their medications: yes    Comments/Recommendations: Updated PTA meds/reviewed patient's allergies.    1)  Medication issues identified: per patient was given Cephalexin 500mg and has only taken 1 dose due to her doctor tell her to stop, she did not have UTI.    2)  Medication changes (since last review):  Added  + lisinopril  + nystatin powder  + ozempic    Removed  - bacitracin oint  - HCTZ  -   - metformin    Adjusted      3)  Pertinent Pharmacy Findings:  Identified High Alert Medication Information  None     ¹RxQuery pharmacy benefit data reflects medications filled and processed through the patient's insurance, however this data does NOT capture whether the medication was picked up or is currently being taken by the patient.    Allergies:  Latex, Cephalexin, and Fluconazole    Chief Complaint for this Admission:    Chief Complaint   Patient presents with    Abdominal Pain     Pt presents ambulatory to ED via triage for c/o LLQ abdominal, vaginal, and lower back pain x2 weeks. Pt reports she had a yeast infection on 6/28/24 and was prescribed Fluconazole that caused an allergic reaction but pt did complete the Rx. Pt was seen by her diabetes doctor and told no UTI but there was blood in her urine. Pt was seen in ER a couple of days ago and pt was treated for UTI but diabetic doctor told her to stop Rx for UTI because she does not have a UTI.    Neck Pain     Pt also complains of right sided neck cramping.     Prior to Admission Medications:   Current Outpatient Medications   Medication Instructions    lisinopril

## 2024-07-26 NOTE — ED PROVIDER NOTES
[facility] EMERGENCY DEPT  EMERGENCY DEPARTMENT ENCOUNTER       Pt Name: Kerrie Soriano  MRN: [id number]  Birthdate 1985  Date of evaluation: 7/26/2024  Provider: Jabier Aleman MD   PCP: No primary care provider on file.  Note Started: 12:07 PM EDT 7/26/24     CHIEF COMPLAINT       Chief Complaint   Patient presents with    Abdominal Pain     Pt presents ambulatory to ED via triage for c/o LLQ abdominal, vaginal, and lower back pain x2 weeks. Pt reports she had a yeast infection on 6/28/24 and was prescribed Fluconazole that caused an allergic reaction but pt did complete the Rx. Pt was seen by her diabetes doctor and told no UTI but there was blood in her urine. Pt was seen in ER a couple of days ago and pt was treated for UTI but diabetic doctor told her to stop Rx for UTI because she does not have a UTI.    Neck Pain     Pt also complains of right sided neck cramping.        HISTORY OF PRESENT ILLNESS: 1 or more elements      History From: Patient and medical records, History limited by: none     Kerrie Soriano is a 38 y.o. female patient with history of diabetes and hypertension, here for abdominal pain and neck pain.  Her abdominal pain started on June 28 after she took Diflucan for yeast infection.  The pain was intermittent, but is now constant and involves the left upper quadrant and left lower quadrant.  She started the Diflucan for vulvovaginal candidiasis, and was seen by Dr. Hoover, OB/GYN at that time.  She saw her endocrinologist 3 days ago, and started treatment for urinary tract infection.  Her culture was negative for infection.  She received a call that she had microscopic hematuria, was told she would need to follow-up with specialist for that.  She denies fever, chest pain, shortness of breath, dysuria, change in bowel habits, vaginal discharge.  For the last 2 days, she has had right-sided neck cramping.  She says she woke up with that.  That pain is 4 out of 10.  Her abdominal pain is 10 out of  Rate and Rhythm: Normal rate.   Pulmonary:      Breath sounds: Normal breath sounds.   Abdominal:      General: Bowel sounds are normal.      Tenderness: There is abdominal tenderness in the left upper quadrant and left lower quadrant.   Musculoskeletal:         General: Normal range of motion.      Cervical back: Normal range of motion.   Skin:     General: Skin is warm.   Neurological:      General: No focal deficit present.      Mental Status: She is alert and oriented to person, place, and time.   Psychiatric:         Mood and Affect: Mood normal.         Behavior: Behavior normal.          DIAGNOSTIC RESULTS   LABS:     Recent Results (from the past 24 hour(s))   CBC with Auto Differential    Collection Time: 07/26/24 10:30 AM   Result Value Ref Range    WBC 6.3 3.6 - 11.0 K/uL    RBC 4.87 3.80 - 5.20 M/uL    Hemoglobin 12.2 11.5 - 16.0 g/dL    Hematocrit 39.3 35.0 - 47.0 %    MCV 80.7 80.0 - 99.0 FL    MCH 25.1 (L) 26.0 - 34.0 PG    MCHC 31.0 30.0 - 36.5 g/dL    RDW 16.2 (H) 11.5 - 14.5 %    Platelets 267 150 - 400 K/uL    MPV 10.9 8.9 - 12.9 FL    Nucleated RBCs 0.0 0  WBC    nRBC 0.00 0.00 - 0.01 K/uL    Neutrophils % 60 32 - 75 %    Lymphocytes % 33 12 - 49 %    Monocytes % 5 5 - 13 %    Eosinophils % 1 0 - 7 %    Basophils % 1 0 - 1 %    Immature Granulocytes % 0 0.0 - 0.5 %    Neutrophils Absolute 3.8 1.8 - 8.0 K/UL    Lymphocytes Absolute 2.1 0.8 - 3.5 K/UL    Monocytes Absolute 0.3 0.0 - 1.0 K/UL    Eosinophils Absolute 0.1 0.0 - 0.4 K/UL    Basophils Absolute 0.0 0.0 - 0.1 K/UL    Immature Granulocytes Absolute 0.0 0.00 - 0.04 K/UL    Differential Type AUTOMATED     Comprehensive Metabolic Panel    Collection Time: 07/26/24 10:30 AM   Result Value Ref Range    Sodium 134 (L) 136 - 145 mmol/L    Potassium Hemolyzed, Recollection Recommended 3.5 - 5.1 mmol/L    Chloride 104 97 - 108 mmol/L    CO2 25 21 - 32 mmol/L    Anion Gap 5 5 - 15 mmol/L    Glucose 127 (H) 65 - 100 mg/dL    BUN 15 6 - 20

## 2024-07-26 NOTE — ED NOTES
Bedside shift change report given to ARCENIO Anthony (oncoming nurse) by ARCENIO Paz (offgoing nurse). Report included the following information Nurse Handoff Report, ED Encounter Summary, ED SBAR, and MAR.

## 2024-07-27 VITALS
BODY MASS INDEX: 41.21 KG/M2 | SYSTOLIC BLOOD PRESSURE: 135 MMHG | OXYGEN SATURATION: 100 % | HEIGHT: 66 IN | HEART RATE: 90 BPM | WEIGHT: 256.39 LBS | TEMPERATURE: 97.9 F | DIASTOLIC BLOOD PRESSURE: 81 MMHG | RESPIRATION RATE: 17 BRPM

## 2024-07-27 LAB
ANION GAP SERPL CALC-SCNC: 6 MMOL/L (ref 5–15)
BACTERIA SPEC CULT: NORMAL
BASOPHILS # BLD: 0 K/UL (ref 0–0.1)
BASOPHILS NFR BLD: 0 % (ref 0–1)
BUN SERPL-MCNC: 13 MG/DL (ref 6–20)
BUN/CREAT SERPL: 11 (ref 12–20)
CALCIUM SERPL-MCNC: 9.1 MG/DL (ref 8.5–10.1)
CHLORIDE SERPL-SCNC: 107 MMOL/L (ref 97–108)
CO2 SERPL-SCNC: 25 MMOL/L (ref 21–32)
CREAT SERPL-MCNC: 1.19 MG/DL (ref 0.55–1.02)
DIFFERENTIAL METHOD BLD: ABNORMAL
EOSINOPHIL # BLD: 0 K/UL (ref 0–0.4)
EOSINOPHIL NFR BLD: 0 % (ref 0–7)
ERYTHROCYTE [DISTWIDTH] IN BLOOD BY AUTOMATED COUNT: 15.8 % (ref 11.5–14.5)
GLUCOSE BLD STRIP.AUTO-MCNC: 100 MG/DL (ref 65–117)
GLUCOSE BLD STRIP.AUTO-MCNC: 120 MG/DL (ref 65–117)
GLUCOSE SERPL-MCNC: 116 MG/DL (ref 65–100)
HCT VFR BLD AUTO: 37.9 % (ref 35–47)
HGB BLD-MCNC: 11.9 G/DL (ref 11.5–16)
IMM GRANULOCYTES # BLD AUTO: 0 K/UL (ref 0–0.04)
IMM GRANULOCYTES NFR BLD AUTO: 0 % (ref 0–0.5)
LYMPHOCYTES # BLD: 1.4 K/UL (ref 0.8–3.5)
LYMPHOCYTES NFR BLD: 19 % (ref 12–49)
MCH RBC QN AUTO: 25.1 PG (ref 26–34)
MCHC RBC AUTO-ENTMCNC: 31.4 G/DL (ref 30–36.5)
MCV RBC AUTO: 80 FL (ref 80–99)
MONOCYTES # BLD: 0.4 K/UL (ref 0–1)
MONOCYTES NFR BLD: 6 % (ref 5–13)
NEUTS SEG # BLD: 5.4 K/UL (ref 1.8–8)
NEUTS SEG NFR BLD: 75 % (ref 32–75)
NRBC # BLD: 0 K/UL (ref 0–0.01)
NRBC BLD-RTO: 0 PER 100 WBC
PLATELET # BLD AUTO: 235 K/UL (ref 150–400)
PMV BLD AUTO: 10.6 FL (ref 8.9–12.9)
POTASSIUM SERPL-SCNC: 4.2 MMOL/L (ref 3.5–5.1)
RBC # BLD AUTO: 4.74 M/UL (ref 3.8–5.2)
SERVICE CMNT-IMP: ABNORMAL
SERVICE CMNT-IMP: NORMAL
SERVICE CMNT-IMP: NORMAL
SODIUM SERPL-SCNC: 138 MMOL/L (ref 136–145)
WBC # BLD AUTO: 7.3 K/UL (ref 3.6–11)

## 2024-07-27 PROCEDURE — 6370000000 HC RX 637 (ALT 250 FOR IP): Performed by: INTERNAL MEDICINE

## 2024-07-27 PROCEDURE — 6360000002 HC RX W HCPCS: Performed by: INTERNAL MEDICINE

## 2024-07-27 PROCEDURE — G0378 HOSPITAL OBSERVATION PER HR: HCPCS

## 2024-07-27 PROCEDURE — 80048 BASIC METABOLIC PNL TOTAL CA: CPT

## 2024-07-27 PROCEDURE — 96366 THER/PROPH/DIAG IV INF ADDON: CPT

## 2024-07-27 PROCEDURE — 85025 COMPLETE CBC W/AUTO DIFF WBC: CPT

## 2024-07-27 PROCEDURE — 82962 GLUCOSE BLOOD TEST: CPT

## 2024-07-27 PROCEDURE — 2580000003 HC RX 258: Performed by: GENERAL ACUTE CARE HOSPITAL

## 2024-07-27 PROCEDURE — 36415 COLL VENOUS BLD VENIPUNCTURE: CPT

## 2024-07-27 RX ORDER — SODIUM CHLORIDE 9 MG/ML
INJECTION, SOLUTION INTRAVENOUS CONTINUOUS
Status: DISCONTINUED | OUTPATIENT
Start: 2024-07-27 | End: 2024-07-27 | Stop reason: HOSPADM

## 2024-07-27 RX ORDER — AMLODIPINE BESYLATE 5 MG/1
5 TABLET ORAL DAILY
Qty: 30 TABLET | Refills: 3 | Status: SHIPPED | OUTPATIENT
Start: 2024-07-28

## 2024-07-27 RX ORDER — MORPHINE SULFATE 2 MG/ML
1 INJECTION, SOLUTION INTRAMUSCULAR; INTRAVENOUS EVERY 4 HOURS PRN
Status: DISCONTINUED | OUTPATIENT
Start: 2024-07-27 | End: 2024-07-27 | Stop reason: HOSPADM

## 2024-07-27 RX ORDER — OXYCODONE HYDROCHLORIDE 5 MG/1
5 TABLET ORAL EVERY 4 HOURS PRN
Status: DISCONTINUED | OUTPATIENT
Start: 2024-07-27 | End: 2024-07-27 | Stop reason: HOSPADM

## 2024-07-27 RX ORDER — OXYCODONE HYDROCHLORIDE 5 MG/1
5 TABLET ORAL EVERY 4 HOURS PRN
Qty: 15 TABLET | Refills: 0 | Status: SHIPPED | OUTPATIENT
Start: 2024-07-27 | End: 2024-07-30

## 2024-07-27 RX ADMIN — ACETAMINOPHEN 650 MG: 325 TABLET ORAL at 17:31

## 2024-07-27 RX ADMIN — SODIUM CHLORIDE: 9 INJECTION, SOLUTION INTRAVENOUS at 10:38

## 2024-07-27 RX ADMIN — LEVOFLOXACIN 750 MG: 5 INJECTION, SOLUTION INTRAVENOUS at 11:48

## 2024-07-27 RX ADMIN — AMLODIPINE BESYLATE 5 MG: 5 TABLET ORAL at 08:09

## 2024-07-27 ASSESSMENT — PAIN SCALES - GENERAL: PAINLEVEL_OUTOF10: 5

## 2024-07-27 ASSESSMENT — PAIN DESCRIPTION - ORIENTATION: ORIENTATION: LEFT

## 2024-07-27 ASSESSMENT — PAIN DESCRIPTION - LOCATION: LOCATION: HEAD

## 2024-07-27 ASSESSMENT — PAIN DESCRIPTION - DESCRIPTORS: DESCRIPTORS: ACHING

## 2024-07-27 NOTE — PROGRESS NOTES
Hospitalist Progress Note    NAME:   Kerrie Soriano   : 1985   MRN: [id number]     Date/Time: 2024    Patient PCP: No primary care provider on file.      Assessment / Plan:      Left-sided staghorn calculus with moderate to severe hydronephrosis  Postobstructive AMADA  ?  UTI  Abdominal pain secondary to above    CT abdomen pelvis: Staghorn calculus in the left renal pelvis measuring 3.3 x 3.1 cm, with resultant moderate to severe left hydronephrosis. Multiple additional stones in the lower pole of the left kidney.  Will admit to medical floor, start IV fluid normal saline at 125 cc/h  Will hold all nephrotoxins including home dose lisinopril   UA showed 1+ bacteria, epithelial cells, + small LE  Plan:   Started on levaquin   Follow-up urine cultures  Pain control  N.p.o. after midnight for urological procedure    Hypertension  Elevated blood pressure, hold lisinopril, start Norvasc. Hydralazine prn    Diabetes mellitus  Patient is on Ozempic 1 mg every 7 days, will hold, start sliding scale insulin, monitor for hypoglycemia due to insulin toxicity       Medical Decision Making:   I personally reviewed labs: yes, as below   I personally reviewed imaging reports: yes, as below   Toxic drug monitoring:   Discussed case with: Pt, RN  Code Status: Full Code       Subjective:  Assessment / Plan:        ***  Discussed with RN events overnight.       Objective:      VITALS:   Last 24hrs VS reviewed since prior progress note. Most recent are:  Patient Vitals for the past 24 hrs:   BP Temp Temp src Pulse Resp SpO2   24 0807 (!) 162/95 97.9 °F (36.6 °C) Oral 88 16 100 %   24 0337 (!) 140/73 98.2 °F (36.8 °C) Oral 74 14 100 %   24 1952 (!) 147/80 97.7 °F (36.5 °C) Oral 74 14 100 %   24 1812 (!) 195/93 97.5 °F (36.4 °C) Oral 65 16 100 %   24 1715 (!) 148/67 97.9 °F (36.6 °C) Oral 72 13 100 %   24 1700 (!) 156/99 -- -- -- -- 97 %   24 1630 (!) 142/76 -- -- 70 16 97 %  750 mg, IntraVENous, Daily, Romina Webb MD, Stopped at 07/26/24 1859    amLODIPine (NORVASC) tablet 5 mg, 5 mg, Oral, Daily, Romina Webb MD, 5 mg at 07/27/24 0809    hydrALAZINE (APRESOLINE) injection 10 mg, 10 mg, IntraVENous, Q6H PRN, Romina Webb MD, 10 mg at 07/26/24 1851    melatonin tablet 3 mg, 3 mg, Oral, Nightly PRN, Antonino Parsons DO, 3 mg at 07/26/24 2311       LABS:    I reviewed today's most current labs and imaging studies.    Pertinent labs include:  Recent Labs     07/26/24  1030 07/27/24  0615   WBC 6.3 7.3   HGB 12.2 11.9   HCT 39.3 37.9    235     Recent Labs     07/26/24  1030 07/26/24  1236 07/27/24  0615   *  --  138   K Hemolyzed, Recollection Recommended 3.6 4.2     --  107   CO2 25  --  25   GLUCOSE 127*  --  116*   BUN 15  --  13   CREATININE 1.37*  --  1.19*   CALCIUM 9.1  --  9.1   BILITOT 0.9  --   --    AST Hemolyzed, Recollection Recommended  --   --    ALT 17  --   --      Xray Result (most recent):  CT ABDOMEN PELVIS W IV CONTRAST Additional Contrast? None  Narrative: EXAM:  CT ABDOMEN PELVIS W IV CONTRAST    INDICATION: Left upper quadrant and left lower quadrant pain, hematuria     COMPARISON: None.    CONTRAST: 100 mL of Isovue-370.    TECHNIQUE:   Following the uneventful intravenous administration of contrast, thin axial  images were obtained through the abdomen and pelvis. Coronal and sagittal  reconstructions were generated. Oral contrast was not administered. CT dose  reduction was achieved through use of a standardized protocol tailored for this  examination and automatic exposure control for dose modulation.    FINDINGS:   Lower Thorax:  Lung Bases: Clear.    Heart: The heart is normal in size. No pericardial effusion.    Abdomen/Pelvis:  Liver:  No focal liver lesions.    Biliary system: Gallbladder is unremarkable. No intrahepatic or extrahepatic  biliary ductal dilatation.    Spleen: Normal.    Pancreas:  Normal.    Kidneys/Ureters/Bladder: Staghorn calculus in the left renal pelvis measuring  3.3 x 3.1 cm, as well as additional stones in the lower pole of the left kidney,  largest measuring 1.6 cm. There is moderate to severe left hydronephrosis. There  is a 2.2 cm simple cyst in the lower pole of the left kidney. The bladder is  normal.    Adrenals: Normal.    Stomach/bowel: No dilation or abnormal wall thickening is present. No free  intraperitoneal air noted. Normal appendix.    Reproductive Organs: There is a 1.5 cm subserosal fibroid in the superior  uterine body. No suspicious adnexal masses.    Vasculature: Normal caliber arteries. Portal vein, SMV, and splenic vein are  patent.    Nodes: No pathologically enlarged lymph nodes.    Fluid: No free fluid.    Bones/Soft Tissue: No acute fractures or aggressive osseous lesions are seen.  Impression: 1. Staghorn calculus in the left renal pelvis measuring 3.3 x 3.1 cm, with  resultant moderate to severe left hydronephrosis. Multiple additional stones in  the lower pole of the left kidney.    Electronically signed by Hermilo Wright     Microbiology:  Results       Procedure Component Value Units Date/Time    Culture, Urine [[id number]]     Order Status: Canceled Specimen: Urine, clean catch     Culture, Urine [[id number]] Collected: 07/26/24 1150    Order Status: Sent Specimen: Urine, clean catch Updated: 07/26/24 1836          ECHO:   No results found for this or any previous visit.    Procedures: see electronic medical records for all procedures/Xrays and details which were not copied into this note but were reviewed prior to creation of Plan.    Reviewed most current lab test results and cultures  YES  Reviewed most current radiology test results   YES  Review and summation of old records today    NO  Reviewed patient's current orders and MAR    YES  PMH/SH reviewed - no change compared to

## 2024-07-27 NOTE — PROGRESS NOTES
Comprehensive Nutrition Assessment    Type and Reason for Visit:  Positive Nutrition Screen    Nutrition Recommendations/Plan:   Resume diet as medically able  Monitor and record PO intakes and Bms in I/Os     Malnutrition Assessment:  Malnutrition Status:  No malnutrition (07/27/24 1140)    Context:  Acute Illness     Findings of the 6 clinical characteristics of malnutrition:  Energy Intake:  Mild decrease in energy intake (Comment)  Weight Loss:  No significant weight loss     Body Fat Loss:  No significant body fat loss     Muscle Mass Loss:  No significant muscle mass loss    Fluid Accumulation:  No significant fluid accumulation     Strength:  Not Performed    Nutrition Assessment:    Admitted for abdominal pain. PMHx includes DM, HTN. Screened for MST2, pt reported unsure wt loss, no decreased appetite/intake. No recent significant weight loss per available EMR wt hx (-1.2% x 6 months). NPO today for urological procedure. Labs: Na 138, K 4.2, BUN 13, Creat 1.19, Gluc 100. Meds: insulin lispro, 0.9%NaCl    Nutrition Related Findings:    NFPE without acute findings. No n/v, d/c, or problems chewing/swallowing. No edema. BM 7/26. Wound Type: None       Current Nutrition Intake & Therapies:    Average Meal Intake: NPO  Average Supplements Intake: None Ordered  Diet NPO Exceptions are: Sips of Water with Meds    Anthropometric Measures:  Height: 167.6 cm (5' 5.98\")  Ideal Body Weight (IBW): 130 lbs (59 kg)    Current Body Weight: 116.3 kg (256 lb 6.3 oz), 197.2 % IBW. Weight Source: Standing Scale  Current BMI (kg/m2): 41.4  BMI Categories: Obese Class 3 (BMI 40.0 or greater)    Estimated Daily Nutrient Needs:  Energy Requirements Based On: Kcal/kg  Weight Used for Energy Requirements: Adjusted  Energy (kcal/day): 1835kcal (25kcal/kg)  Weight Used for Protein Requirements: Adjusted  Protein (g/day): 73g (1g/kg)  Method Used for Fluid Requirements: 1 ml/kcal  Fluid (ml/day): 1835mL    Nutrition Diagnosis:   No

## 2024-07-27 NOTE — PLAN OF CARE
Problem: Chronic Conditions and Co-morbidities  Goal: Patient's chronic conditions and co-morbidity symptoms are monitored and maintained or improved  7/27/2024 0903 by Marcus Awad RN  Outcome: Progressing  7/27/2024 0902 by Marcus Awad RN  Outcome: Progressing  7/27/2024 0249 by Analilia Juan RN  Outcome: Progressing  Flowsheets (Taken 7/27/2024 0249)  Care Plan - Patient's Chronic Conditions and Co-Morbidity Symptoms are Monitored and Maintained or Improved: Monitor and assess patient's chronic conditions and comorbid symptoms for stability, deterioration, or improvement     Problem: Safety - Adult  Goal: Free from fall injury  7/27/2024 0903 by Marcus Awad RN  Outcome: Progressing  7/27/2024 0902 by Marcus Awad RN  Outcome: Progressing  7/27/2024 0249 by Analilia Juan RN  Outcome: Progressing  Flowsheets (Taken 7/27/2024 0249)  Free From Fall Injury: Instruct family/caregiver on patient safety     Problem: Discharge Planning  Goal: Discharge to home or other facility with appropriate resources  7/27/2024 0903 by Marcus Awad RN  Outcome: Progressing  7/27/2024 0902 by Marcus Awad RN  Outcome: Progressing  7/27/2024 0249 by Analilia Juan RN  Outcome: Progressing  Flowsheets (Taken 7/27/2024 0249)  Discharge to home or other facility with appropriate resources: Identify barriers to discharge with patient and caregiver

## 2024-07-27 NOTE — DISCHARGE INSTRUCTIONS
HOSPITALIST DISCHARGE INSTRUCTIONS    It is important that you take the medication exactly as they are prescribed.   Keep your medication in the bottles provided by the pharmacist and keep a list of the medication names, dosages, and times to be taken in your wallet.   Do not take other medications without consulting your doctor.     What to do at Home  Recommended diet:   ADULT DIET; Regular    Recommended activity:   activity as tolerated    If you have questions regarding the hospital related prescriptions or hospital related issues please call [facility]' office at . You can always direct your questions to your primary care doctor if you are unable to reach your hospital physician; your PCP works as an extension of your hospital doctor just like your hospital doctor is an extension of your PCP for your time at the hospital ([facility])    If you experience any of the following symptoms then please call your primary care physician or return to the emergency room if you cannot get hold of your doctor:    Fever, chills, nausea, vomiting, or persistent diarrhea  Worsening weakness or new problems with your speech or balance  Dark stools or visible blood in your stools  New Leg swelling or shortness of breath as these could be signs of a clot    Additional Instructions:    Please follow up with your PCP in one week.   Bring these papers with you to your follow up appointments. The papers will help your doctors be sure to continue the care plan from the hospital.        Information obtained by :  I understand that if any problems occur once I am at home I am to contact my physician.    I understand and acknowledge receipt of the instructions indicated above.                                                                                                                                           Physician's or R.N.'s Signature                                                                   when it passes. You can use a kitchen strainer or a tea strainer to catch the stone. Store it in a plastic bag until you see your doctor again.  Preventing future kidney stones  Some changes in your diet may help prevent kidney stones. Depending on the cause of your stones, your doctor may recommend that you:  Drink plenty of fluids. If you have kidney, heart, or liver disease and have to limit fluids, talk with your doctor before you increase the amount of fluids you drink.  Limit coffee, tea, and alcohol. Also avoid grapefruit juice.  Do not take more than the recommended daily dose of vitamins C and D.  Avoid antacids such as Maalox, Mylanta, or Tums.  Limit the amount of salt (sodium) in your diet.  Eat a balanced diet that is not too high in protein.  Limit foods that are high in a substance called oxalate, which can cause kidney stones. These foods include dark green vegetables, rhubarb, chocolate, wheat bran, nuts, cranberries, and beans.  When should you call for help?   Call your doctor now or seek immediate medical care if:    You cannot keep down fluids.     Your pain gets worse.     You have a fever or chills.     You have new or worse pain in your back just below your rib cage (the flank area).     You have new or more blood in your urine.   Watch closely for changes in your health, and be sure to contact your doctor if:    You do not get better as expected.   Where can you learn more?  Go to https://[email or web address].net/patientEd and enter X633 to learn more about \"Kidney Stone: Care Instructions.\"  Current as of: November 15, 2023  Content Version: 14.1  © 6000-8363 [employer].   Care instructions adapted under license by [employer]. If you have questions about a medical condition or this instruction, always ask your healthcare professional. [employer] disclaims any warranty or liability for your use of this information.

## 2024-07-27 NOTE — PLAN OF CARE
Problem: Discharge Planning  Goal: Discharge to home or other facility with appropriate resources  Outcome: Progressing  Flowsheets (Taken 7/27/2024 0249)  Discharge to home or other facility with appropriate resources: Identify barriers to discharge with patient and caregiver     Problem: Safety - Adult  Goal: Free from fall injury  Outcome: Progressing  Flowsheets (Taken 7/27/2024 0249)  Free From Fall Injury: Instruct family/caregiver on patient safety     Problem: Chronic Conditions and Co-morbidities  Goal: Patient's chronic conditions and co-morbidity symptoms are monitored and maintained or improved  Outcome: Progressing  Flowsheets (Taken 7/27/2024 0249)  Care Plan - Patient's Chronic Conditions and Co-Morbidity Symptoms are Monitored and Maintained or Improved: Monitor and assess patient's chronic conditions and comorbid symptoms for stability, deterioration, or improvement

## 2024-07-27 NOTE — PROGRESS NOTES
Requesting Provider: Lizzy Grimaldo MD - Reason for Consultation: \"Staghorn calculus on the left 3.3 x 3.1 cm with moderate to severe hydronephrosis\"  Pre-existing [facility] Patient:   No                Patient: Kerrie Soriano MRN: [id number]  SSN: [id number]    YOB: 1985  Age: 38 y.o.  Sex: female     Location: [address] [place] [address]       Code Status: Full Code   PCP: No primary care provider on file.  - None   Emergency Contact:  Primary Emergency Contact: BoLorinHarlan Phone: [phone]   Race/Sikhism/Language: Black /  / None / Speaks English   Payor: Payor: [facility] / Plan: [employer] BETTER [facility] / Product Type: *No Product type* /    Prior Admission Data: 1/13/24 [facility] EMERGENCY DEPT     Hospitalized:  Hospital Day: 2 - Admitted 7/26/2024 10:04 AM   POD # * No surgery found *  by * Surgery not found * - Blood Loss: * No surgery found * * Surgery not found *     CONSULTANTS  IP CONSULT TO UROLOGY   ADMISSION DIAGNOSES  [unfilled]      Assessment/Plan:       Moderate to severe left hydronephrosis 2/2 3.3 x 3.1 cm staghorn calculus in the left renal pelvis  Left kidney stones - additional to the staghorn as large as 1.6 cm  Left flank pain  Left lower quadrant abdominal pain  - No urgent urological surgical intervention at this time  - I did discuss the potential need for advanced stone treatment with process of procedure PCNL; I did explain to the patient we are not scheduling any surgery at this time  - can eat today  - will need outpateint follow up for definitive stone management. Please have her call [phone] for follow up.    Thanks for consult call with questions.            CC: [unfilled]   HPI: She is a 38 y.o. female we are consulted to see due to findings of a staghorn calculus in her left kidney.  It is 3.3 x 3.1 cm in size.  Moderate to severe hydronephrosis is noted.  The patient reports past history of kidney stones which have not required

## 2024-08-07 NOTE — PROGRESS NOTES
Physician Progress Note      PATIENT:               AMADEO WILLIS  CSN #:                  [id number]  :                       1985  ADMIT DATE:       2024 10:04 AM  DISCH DATE:        2024 7:05 PM  RESPONDING  PROVIDER #:        Lizzy Grimaldo MD          QUERY TEXT:    Patient admitted with Abdominal pain secondary to left-sided staghorn   calculus.  Noted documentation of \"Post-obstructive AMADA\" per H&P dated .    In order to support the diagnosis of \"Post-obstructive AMADA\", please include   additional clinical indicators in your documentation.? Or please document if   the diagnosis of \"Post-obstructive AMADA\" has been ruled out after further   study.    The medical record reflects the following:  Risk Factors: Hx: DM; HTN..C/o Abd pain; D/w Left-sided staghorn calculus with   associated moderate to severe hydronephrosis    Clinical Indicators: (): Bun/Cr: 15/1.37; GFR: 50; (): Bun/Cr:   13/1.19; GFR: 59; UA: ketones: trace; blood: moderate; protein: 30; le: small;   granular cast: 0-2; wbc: 5-10; bact: 1+; CT Abd/Pelvis: Staghorn calculus in   the left renal pelvis measuring 3.3 x 3.1 cm, with resultant moderate to   severe left hydronephrosis. Multiple additional stones in the lower pole of   the left kidney.     Urology PN: \"Moderate to severe left hydronephrosis 2/2 3.3 x 3.1 cm   staghorn calculus in the left renal pelvis; Left kidney stones - additional to   the staghorn as large as 1.6 cm\".     AP: \"Abdominal pain secondary to left-sided staghorn calculus with   associated moderate to severe hydronephrosis  Postobstructive AMADA  ?  UTI\".    Treatment: Monitor Cr; UA; CT Abd/Pelvis; IP Admit; IVF NS gtt @125ml/hr; Will   hold all nephrotoxins including home dose lisinopril; IV ABx; Urology   consult;    Thank you,    Oly Santillan  CDI  [email or web address]    Defined by Kidney Disease Improving Global Outcomes (KDIGO) clinical practice   guideline for acute kidney

## 2024-09-03 ENCOUNTER — HOSPITAL ENCOUNTER (OUTPATIENT)
Facility: HOSPITAL | Age: 39
Discharge: HOME | End: 2024-09-06
Payer: COMMERCIAL

## 2024-09-03 VITALS
RESPIRATION RATE: 20 BRPM | BODY MASS INDEX: 40.92 KG/M2 | HEIGHT: 66 IN | DIASTOLIC BLOOD PRESSURE: 88 MMHG | WEIGHT: 254.6 LBS | SYSTOLIC BLOOD PRESSURE: 150 MMHG | TEMPERATURE: 98 F | HEART RATE: 75 BPM

## 2024-09-03 LAB
ALBUMIN SERPL-MCNC: 3.2 G/DL (ref 3.5–5)
ALBUMIN/GLOB SERPL: 0.7 (ref 1.1–2.2)
ALP SERPL-CCNC: 154 U/L (ref 45–117)
ALT SERPL-CCNC: 8 U/L (ref 12–78)
ANION GAP SERPL CALC-SCNC: 6 MMOL/L (ref 5–15)
APPEARANCE UR: CLEAR
AST SERPL-CCNC: 8 U/L (ref 15–37)
BACTERIA URNS QL MICRO: ABNORMAL /HPF
BASOPHILS # BLD: 0 K/UL (ref 0–0.1)
BASOPHILS NFR BLD: 1 % (ref 0–1)
BILIRUB SERPL-MCNC: 0.4 MG/DL (ref 0.2–1)
BILIRUB UR QL: NEGATIVE
BUN SERPL-MCNC: 11 MG/DL (ref 6–20)
BUN/CREAT SERPL: 9 (ref 12–20)
CALCIUM SERPL-MCNC: 9.3 MG/DL (ref 8.5–10.1)
CAOX CRY URNS QL MICRO: ABNORMAL
CHLORIDE SERPL-SCNC: 108 MMOL/L (ref 97–108)
CO2 SERPL-SCNC: 24 MMOL/L (ref 21–32)
COLOR UR: ABNORMAL
CREAT SERPL-MCNC: 1.19 MG/DL (ref 0.55–1.02)
DIFFERENTIAL METHOD BLD: ABNORMAL
EKG ATRIAL RATE: 73 BPM
EKG DIAGNOSIS: NORMAL
EKG P AXIS: 18 DEGREES
EKG P-R INTERVAL: 168 MS
EKG Q-T INTERVAL: 394 MS
EKG QRS DURATION: 68 MS
EKG QTC CALCULATION (BAZETT): 434 MS
EKG R AXIS: 14 DEGREES
EKG T AXIS: -6 DEGREES
EKG VENTRICULAR RATE: 73 BPM
EOSINOPHIL # BLD: 0.1 K/UL (ref 0–0.4)
EOSINOPHIL NFR BLD: 1 % (ref 0–7)
EPITH CASTS URNS QL MICRO: ABNORMAL /LPF
ERYTHROCYTE [DISTWIDTH] IN BLOOD BY AUTOMATED COUNT: 15.9 % (ref 11.5–14.5)
EST. AVERAGE GLUCOSE BLD GHB EST-MCNC: 140 MG/DL
GLOBULIN SER CALC-MCNC: 4.8 G/DL (ref 2–4)
GLUCOSE SERPL-MCNC: 152 MG/DL (ref 65–100)
GLUCOSE UR STRIP.AUTO-MCNC: NEGATIVE MG/DL
HBA1C MFR BLD: 6.5 % (ref 4–5.6)
HCT VFR BLD AUTO: 38 % (ref 35–47)
HGB BLD-MCNC: 11.6 G/DL (ref 11.5–16)
HGB UR QL STRIP: ABNORMAL
IMM GRANULOCYTES # BLD AUTO: 0 K/UL (ref 0–0.04)
IMM GRANULOCYTES NFR BLD AUTO: 0 % (ref 0–0.5)
KETONES UR QL STRIP.AUTO: NEGATIVE MG/DL
LEUKOCYTE ESTERASE UR QL STRIP.AUTO: NEGATIVE
LYMPHOCYTES # BLD: 1.6 K/UL (ref 0.8–3.5)
LYMPHOCYTES NFR BLD: 29 % (ref 12–49)
MCH RBC QN AUTO: 25.1 PG (ref 26–34)
MCHC RBC AUTO-ENTMCNC: 30.5 G/DL (ref 30–36.5)
MCV RBC AUTO: 82.1 FL (ref 80–99)
MONOCYTES # BLD: 0.2 K/UL (ref 0–1)
MONOCYTES NFR BLD: 5 % (ref 5–13)
NEUTS SEG # BLD: 3.5 K/UL (ref 1.8–8)
NEUTS SEG NFR BLD: 64 % (ref 32–75)
NITRITE UR QL STRIP.AUTO: NEGATIVE
NRBC # BLD: 0 K/UL (ref 0–0.01)
NRBC BLD-RTO: 0 PER 100 WBC
PH UR STRIP: 5.5 (ref 5–8)
PLATELET # BLD AUTO: 283 K/UL (ref 150–400)
PMV BLD AUTO: 11 FL (ref 8.9–12.9)
POTASSIUM SERPL-SCNC: 3.7 MMOL/L (ref 3.5–5.1)
PROT SERPL-MCNC: 8 G/DL (ref 6.4–8.2)
PROT UR STRIP-MCNC: 100 MG/DL
RBC # BLD AUTO: 4.63 M/UL (ref 3.8–5.2)
RBC #/AREA URNS HPF: ABNORMAL /HPF (ref 0–5)
SODIUM SERPL-SCNC: 138 MMOL/L (ref 136–145)
SP GR UR REFRACTOMETRY: 1.02 (ref 1–1.03)
URINE CULTURE IF INDICATED: ABNORMAL
UROBILINOGEN UR QL STRIP.AUTO: 0.2 EU/DL (ref 0.2–1)
WBC # BLD AUTO: 5.4 K/UL (ref 3.6–11)
WBC URNS QL MICRO: ABNORMAL /HPF (ref 0–4)

## 2024-09-03 PROCEDURE — 83036 HEMOGLOBIN GLYCOSYLATED A1C: CPT

## 2024-09-03 PROCEDURE — 93005 ELECTROCARDIOGRAM TRACING: CPT | Performed by: STUDENT IN AN ORGANIZED HEALTH CARE EDUCATION/TRAINING PROGRAM

## 2024-09-03 PROCEDURE — 80053 COMPREHEN METABOLIC PANEL: CPT

## 2024-09-03 PROCEDURE — 81001 URINALYSIS AUTO W/SCOPE: CPT

## 2024-09-03 PROCEDURE — 85025 COMPLETE CBC W/AUTO DIFF WBC: CPT

## 2024-09-03 PROCEDURE — 36415 COLL VENOUS BLD VENIPUNCTURE: CPT

## 2024-09-03 NOTE — PERIOP NOTE
[address] [address]   MAIN OR                                  [phone]    MAIN PRE OP             [phone]                                                                                AMBULATORY PRE OP          [phone]  PRE-ADMISSION TESTING    [phone]     Surgery Date:  9-16-24       Is surgery arrival time given by surgeon?  YES  NO    If “NO”, [facility] staff will call you between 4 and 7pm the day before your surgery with your arrival time. (If your surgery is on a Monday, we will call you the Friday before.)    Call [phone] after 7pm Monday-Friday if you did not receive this call.    INSTRUCTIONS BEFORE YOUR SURGERY   When You  Arrive Arrive at [facility] Patient Access on 1st floor the day of your surgery.   Medications to   TAKE   Morning of Surgery MEDICATIONS TO TAKE THE MORNING OF SURGERY WITH A SIP OF WATER: NONE    You may take these medications, IF NEEDED, the morning of surgery: NONE  Ask your surgeon/prescribing doctor for instructions on taking or stopping these medications prior to surgery: NONE   Medications to STOP  before surgery Non-Steroidal anti-inflammatory Drugs (NSAID's): for example, Diclofenac (Voltaren), Ibuprofen (Advil, Motrin), Naproxen (Aleve) 3 days  STOP all herbal supplements and vitamins(unless prescribed by your doctor), and fish oil for 7 days  Other: NONE  (Pain medications not listed above, including Tylenol may be taken up until 4 hours prior to arrival time)   Blood  Thinners If you take Aspirin, Plavix, Coumadin, or any blood-thinning or anti-blood clot medicine, talk to the doctor who prescribed the medications for pre-operative instructions.  If you take aspirin or aspirin containing products for pain, stop 7 days prior to surgery   Going Home - or Spending the Night OUTPATIENT SURGERY: You must have a responsible adult drive you home and stay with you 24 hours after

## 2024-09-16 ENCOUNTER — ANESTHESIA EVENT (OUTPATIENT)
Facility: HOSPITAL | Age: 39
End: 2024-09-16
Payer: COMMERCIAL

## 2024-09-16 ENCOUNTER — ANESTHESIA (OUTPATIENT)
Facility: HOSPITAL | Age: 39
End: 2024-09-16
Payer: COMMERCIAL

## 2024-09-16 ENCOUNTER — HOSPITAL ENCOUNTER (OUTPATIENT)
Facility: HOSPITAL | Age: 39
Setting detail: OUTPATIENT SURGERY
Discharge: HOME | End: 2024-09-16
Attending: STUDENT IN AN ORGANIZED HEALTH CARE EDUCATION/TRAINING PROGRAM | Admitting: STUDENT IN AN ORGANIZED HEALTH CARE EDUCATION/TRAINING PROGRAM
Payer: COMMERCIAL

## 2024-09-16 VITALS
HEIGHT: 66 IN | WEIGHT: 254 LBS | DIASTOLIC BLOOD PRESSURE: 103 MMHG | SYSTOLIC BLOOD PRESSURE: 161 MMHG | BODY MASS INDEX: 40.82 KG/M2 | TEMPERATURE: 98.4 F | HEART RATE: 102 BPM | OXYGEN SATURATION: 99 % | RESPIRATION RATE: 14 BRPM

## 2024-09-16 DIAGNOSIS — N20.0 KIDNEY STONE: Primary | ICD-10-CM

## 2024-09-16 LAB
GLUCOSE BLD STRIP.AUTO-MCNC: 110 MG/DL (ref 65–117)
HCG UR QL: NEGATIVE
SERVICE CMNT-IMP: NORMAL

## 2024-09-16 PROCEDURE — C1769 GUIDE WIRE: HCPCS | Performed by: STUDENT IN AN ORGANIZED HEALTH CARE EDUCATION/TRAINING PROGRAM

## 2024-09-16 PROCEDURE — 3600000004 HC SURGERY LEVEL 4 BASE: Performed by: STUDENT IN AN ORGANIZED HEALTH CARE EDUCATION/TRAINING PROGRAM

## 2024-09-16 PROCEDURE — 81025 URINE PREGNANCY TEST: CPT

## 2024-09-16 PROCEDURE — 2500000003 HC RX 250 WO HCPCS: Performed by: NURSE PRACTITIONER

## 2024-09-16 PROCEDURE — 6360000002 HC RX W HCPCS: Performed by: NURSE PRACTITIONER

## 2024-09-16 PROCEDURE — 7100000000 HC PACU RECOVERY - FIRST 15 MIN: Performed by: STUDENT IN AN ORGANIZED HEALTH CARE EDUCATION/TRAINING PROGRAM

## 2024-09-16 PROCEDURE — 3700000001 HC ADD 15 MINUTES (ANESTHESIA): Performed by: STUDENT IN AN ORGANIZED HEALTH CARE EDUCATION/TRAINING PROGRAM

## 2024-09-16 PROCEDURE — 2720000010 HC SURG SUPPLY STERILE: Performed by: STUDENT IN AN ORGANIZED HEALTH CARE EDUCATION/TRAINING PROGRAM

## 2024-09-16 PROCEDURE — 3700000000 HC ANESTHESIA ATTENDED CARE: Performed by: STUDENT IN AN ORGANIZED HEALTH CARE EDUCATION/TRAINING PROGRAM

## 2024-09-16 PROCEDURE — 6360000002 HC RX W HCPCS: Performed by: STUDENT IN AN ORGANIZED HEALTH CARE EDUCATION/TRAINING PROGRAM

## 2024-09-16 PROCEDURE — 7100000011 HC PHASE II RECOVERY - ADDTL 15 MIN: Performed by: STUDENT IN AN ORGANIZED HEALTH CARE EDUCATION/TRAINING PROGRAM

## 2024-09-16 PROCEDURE — 3600000014 HC SURGERY LEVEL 4 ADDTL 15MIN: Performed by: STUDENT IN AN ORGANIZED HEALTH CARE EDUCATION/TRAINING PROGRAM

## 2024-09-16 PROCEDURE — 6360000002 HC RX W HCPCS

## 2024-09-16 PROCEDURE — C1894 INTRO/SHEATH, NON-LASER: HCPCS | Performed by: STUDENT IN AN ORGANIZED HEALTH CARE EDUCATION/TRAINING PROGRAM

## 2024-09-16 PROCEDURE — 2709999900 HC NON-CHARGEABLE SUPPLY: Performed by: STUDENT IN AN ORGANIZED HEALTH CARE EDUCATION/TRAINING PROGRAM

## 2024-09-16 PROCEDURE — 88300 SURGICAL PATH GROSS: CPT

## 2024-09-16 PROCEDURE — 7100000010 HC PHASE II RECOVERY - FIRST 15 MIN: Performed by: STUDENT IN AN ORGANIZED HEALTH CARE EDUCATION/TRAINING PROGRAM

## 2024-09-16 PROCEDURE — C2617 STENT, NON-COR, TEM W/O DEL: HCPCS | Performed by: STUDENT IN AN ORGANIZED HEALTH CARE EDUCATION/TRAINING PROGRAM

## 2024-09-16 PROCEDURE — C1747 HC ENDOSCOPE, SINGLE, URINARY TRACT: HCPCS | Performed by: STUDENT IN AN ORGANIZED HEALTH CARE EDUCATION/TRAINING PROGRAM

## 2024-09-16 PROCEDURE — 2580000003 HC RX 258: Performed by: NURSE PRACTITIONER

## 2024-09-16 PROCEDURE — 82962 GLUCOSE BLOOD TEST: CPT

## 2024-09-16 PROCEDURE — 7100000001 HC PACU RECOVERY - ADDTL 15 MIN: Performed by: STUDENT IN AN ORGANIZED HEALTH CARE EDUCATION/TRAINING PROGRAM

## 2024-09-16 PROCEDURE — 82360 CALCULUS ASSAY QUANT: CPT

## 2024-09-16 PROCEDURE — 6370000000 HC RX 637 (ALT 250 FOR IP): Performed by: STUDENT IN AN ORGANIZED HEALTH CARE EDUCATION/TRAINING PROGRAM

## 2024-09-16 DEVICE — URETERAL STENT
Type: IMPLANTABLE DEVICE | Site: URETHRA | Status: FUNCTIONAL
Brand: CONTOUR™

## 2024-09-16 RX ORDER — FENTANYL CITRATE 50 UG/ML
INJECTION, SOLUTION INTRAMUSCULAR; INTRAVENOUS
Status: COMPLETED
Start: 2024-09-16 | End: 2024-09-16

## 2024-09-16 RX ORDER — TAMSULOSIN HYDROCHLORIDE 0.4 MG/1
0.4 CAPSULE ORAL DAILY
Qty: 30 CAPSULE | Refills: 0 | Status: SHIPPED | OUTPATIENT
Start: 2024-09-16

## 2024-09-16 RX ORDER — SODIUM CHLORIDE 9 MG/ML
INJECTION, SOLUTION INTRAVENOUS PRN
Status: DISCONTINUED | OUTPATIENT
Start: 2024-09-16 | End: 2024-09-16 | Stop reason: HOSPADM

## 2024-09-16 RX ORDER — HYDROCODONE BITARTRATE AND ACETAMINOPHEN 5; 325 MG/1; MG/1
1 TABLET ORAL EVERY 6 HOURS PRN
Qty: 15 TABLET | Refills: 0 | Status: SHIPPED | OUTPATIENT
Start: 2024-09-16 | End: 2024-09-19

## 2024-09-16 RX ORDER — SODIUM CHLORIDE 0.9 % (FLUSH) 0.9 %
5-40 SYRINGE (ML) INJECTION EVERY 12 HOURS SCHEDULED
Status: DISCONTINUED | OUTPATIENT
Start: 2024-09-16 | End: 2024-09-16 | Stop reason: HOSPADM

## 2024-09-16 RX ORDER — ONDANSETRON 2 MG/ML
INJECTION INTRAMUSCULAR; INTRAVENOUS
Status: DISCONTINUED | OUTPATIENT
Start: 2024-09-16 | End: 2024-09-16 | Stop reason: SDUPTHER

## 2024-09-16 RX ORDER — FENTANYL CITRATE 50 UG/ML
INJECTION, SOLUTION INTRAMUSCULAR; INTRAVENOUS
Status: DISCONTINUED | OUTPATIENT
Start: 2024-09-16 | End: 2024-09-16 | Stop reason: SDUPTHER

## 2024-09-16 RX ORDER — SODIUM CHLORIDE 0.9 % (FLUSH) 0.9 %
5-40 SYRINGE (ML) INJECTION PRN
Status: DISCONTINUED | OUTPATIENT
Start: 2024-09-16 | End: 2024-09-16 | Stop reason: HOSPADM

## 2024-09-16 RX ORDER — ONDANSETRON 2 MG/ML
4 INJECTION INTRAMUSCULAR; INTRAVENOUS
Status: DISCONTINUED | OUTPATIENT
Start: 2024-09-16 | End: 2024-09-16 | Stop reason: HOSPADM

## 2024-09-16 RX ORDER — CEFAZOLIN SODIUM 1 G/3ML
INJECTION, POWDER, FOR SOLUTION INTRAMUSCULAR; INTRAVENOUS
Status: DISCONTINUED | OUTPATIENT
Start: 2024-09-16 | End: 2024-09-16 | Stop reason: SDUPTHER

## 2024-09-16 RX ORDER — OXYCODONE HYDROCHLORIDE 5 MG/1
5 TABLET ORAL
Status: COMPLETED | OUTPATIENT
Start: 2024-09-16 | End: 2024-09-16

## 2024-09-16 RX ORDER — SODIUM CHLORIDE, SODIUM LACTATE, POTASSIUM CHLORIDE, CALCIUM CHLORIDE 600; 310; 30; 20 MG/100ML; MG/100ML; MG/100ML; MG/100ML
INJECTION, SOLUTION INTRAVENOUS
Status: DISCONTINUED | OUTPATIENT
Start: 2024-09-16 | End: 2024-09-16 | Stop reason: SDUPTHER

## 2024-09-16 RX ORDER — LIDOCAINE HYDROCHLORIDE 20 MG/ML
INJECTION, SOLUTION EPIDURAL; INFILTRATION; INTRACAUDAL; PERINEURAL
Status: DISCONTINUED | OUTPATIENT
Start: 2024-09-16 | End: 2024-09-16 | Stop reason: SDUPTHER

## 2024-09-16 RX ORDER — DEXAMETHASONE SODIUM PHOSPHATE 4 MG/ML
INJECTION, SOLUTION INTRA-ARTICULAR; INTRALESIONAL; INTRAMUSCULAR; INTRAVENOUS; SOFT TISSUE
Status: DISCONTINUED | OUTPATIENT
Start: 2024-09-16 | End: 2024-09-16 | Stop reason: SDUPTHER

## 2024-09-16 RX ORDER — HYDRALAZINE HYDROCHLORIDE 20 MG/ML
10 INJECTION INTRAMUSCULAR; INTRAVENOUS ONCE
Status: COMPLETED | OUTPATIENT
Start: 2024-09-16 | End: 2024-09-16

## 2024-09-16 RX ORDER — NALOXONE HYDROCHLORIDE 0.4 MG/ML
INJECTION, SOLUTION INTRAMUSCULAR; INTRAVENOUS; SUBCUTANEOUS PRN
Status: DISCONTINUED | OUTPATIENT
Start: 2024-09-16 | End: 2024-09-16 | Stop reason: HOSPADM

## 2024-09-16 RX ORDER — MIDAZOLAM HYDROCHLORIDE 1 MG/ML
INJECTION INTRAMUSCULAR; INTRAVENOUS
Status: DISCONTINUED | OUTPATIENT
Start: 2024-09-16 | End: 2024-09-16 | Stop reason: SDUPTHER

## 2024-09-16 RX ORDER — FENTANYL CITRATE 50 UG/ML
25 INJECTION, SOLUTION INTRAMUSCULAR; INTRAVENOUS EVERY 5 MIN PRN
Status: COMPLETED | OUTPATIENT
Start: 2024-09-16 | End: 2024-09-16

## 2024-09-16 RX ORDER — PROCHLORPERAZINE EDISYLATE 5 MG/ML
5 INJECTION INTRAMUSCULAR; INTRAVENOUS
Status: DISCONTINUED | OUTPATIENT
Start: 2024-09-16 | End: 2024-09-16 | Stop reason: HOSPADM

## 2024-09-16 RX ADMIN — FENTANYL CITRATE 50 MCG: 50 INJECTION, SOLUTION INTRAMUSCULAR; INTRAVENOUS at 15:06

## 2024-09-16 RX ADMIN — FENTANYL CITRATE 50 MCG: 50 INJECTION, SOLUTION INTRAMUSCULAR; INTRAVENOUS at 16:04

## 2024-09-16 RX ADMIN — MIDAZOLAM HYDROCHLORIDE 3 MG: 1 INJECTION, SOLUTION INTRAMUSCULAR; INTRAVENOUS at 13:10

## 2024-09-16 RX ADMIN — ONDANSETRON 4 MG: 2 INJECTION INTRAMUSCULAR; INTRAVENOUS at 16:05

## 2024-09-16 RX ADMIN — CEFAZOLIN 2 G: 1 INJECTION, POWDER, FOR SOLUTION INTRAMUSCULAR; INTRAVENOUS at 13:24

## 2024-09-16 RX ADMIN — FENTANYL CITRATE 25 MCG: 50 INJECTION INTRAMUSCULAR; INTRAVENOUS at 18:03

## 2024-09-16 RX ADMIN — MIDAZOLAM HYDROCHLORIDE 2 MG: 1 INJECTION, SOLUTION INTRAMUSCULAR; INTRAVENOUS at 13:12

## 2024-09-16 RX ADMIN — LIDOCAINE HYDROCHLORIDE 100 MG: 20 INJECTION, SOLUTION EPIDURAL; INFILTRATION; INTRACAUDAL; PERINEURAL at 13:19

## 2024-09-16 RX ADMIN — ONDANSETRON 4 MG: 2 INJECTION INTRAMUSCULAR; INTRAVENOUS at 13:24

## 2024-09-16 RX ADMIN — FENTANYL CITRATE 25 MCG: 50 INJECTION INTRAMUSCULAR; INTRAVENOUS at 17:09

## 2024-09-16 RX ADMIN — PROPOFOL 200 MG: 10 INJECTION, EMULSION INTRAVENOUS at 13:19

## 2024-09-16 RX ADMIN — OXYCODONE HYDROCHLORIDE 5 MG: 5 TABLET ORAL at 18:27

## 2024-09-16 RX ADMIN — FENTANYL CITRATE 50 MCG: 50 INJECTION, SOLUTION INTRAMUSCULAR; INTRAVENOUS at 13:54

## 2024-09-16 RX ADMIN — FENTANYL CITRATE 25 MCG: 50 INJECTION INTRAMUSCULAR; INTRAVENOUS at 16:45

## 2024-09-16 RX ADMIN — SODIUM CHLORIDE, POTASSIUM CHLORIDE, SODIUM LACTATE AND CALCIUM CHLORIDE: 600; 310; 30; 20 INJECTION, SOLUTION INTRAVENOUS at 13:17

## 2024-09-16 RX ADMIN — HYDRALAZINE HYDROCHLORIDE 10 MG: 20 INJECTION INTRAMUSCULAR; INTRAVENOUS at 17:13

## 2024-09-16 RX ADMIN — SODIUM CHLORIDE, POTASSIUM CHLORIDE, SODIUM LACTATE AND CALCIUM CHLORIDE: 600; 310; 30; 20 INJECTION, SOLUTION INTRAVENOUS at 15:50

## 2024-09-16 RX ADMIN — FENTANYL CITRATE 50 MCG: 50 INJECTION, SOLUTION INTRAMUSCULAR; INTRAVENOUS at 13:19

## 2024-09-16 RX ADMIN — FENTANYL CITRATE 25 MCG: 50 INJECTION INTRAMUSCULAR; INTRAVENOUS at 17:26

## 2024-09-16 RX ADMIN — DEXAMETHASONE SODIUM PHOSPHATE 4 MG: 4 INJECTION, SOLUTION INTRAMUSCULAR; INTRAVENOUS at 13:24

## 2024-09-16 ASSESSMENT — PAIN DESCRIPTION - LOCATION
LOCATION: ABDOMEN

## 2024-09-16 ASSESSMENT — PAIN DESCRIPTION - DESCRIPTORS
DESCRIPTORS: CRAMPING
DESCRIPTORS: CRAMPING
DESCRIPTORS: CRAMPING;DISCOMFORT
DESCRIPTORS: CRAMPING

## 2024-09-16 ASSESSMENT — PAIN SCALES - GENERAL
PAINLEVEL_OUTOF10: 8
PAINLEVEL_OUTOF10: 7
PAINLEVEL_OUTOF10: 10
PAINLEVEL_OUTOF10: 7

## 2024-09-16 ASSESSMENT — PAIN - FUNCTIONAL ASSESSMENT
PAIN_FUNCTIONAL_ASSESSMENT: NONE - DENIES PAIN
PAIN_FUNCTIONAL_ASSESSMENT: 0-10

## 2024-09-24 ENCOUNTER — APPOINTMENT (OUTPATIENT)
Facility: HOSPITAL | Age: 39
End: 2024-09-24
Attending: STUDENT IN AN ORGANIZED HEALTH CARE EDUCATION/TRAINING PROGRAM
Payer: COMMERCIAL

## 2024-09-24 ENCOUNTER — ANESTHESIA (OUTPATIENT)
Facility: HOSPITAL | Age: 39
End: 2024-09-24
Payer: COMMERCIAL

## 2024-09-24 ENCOUNTER — ANESTHESIA EVENT (OUTPATIENT)
Facility: HOSPITAL | Age: 39
End: 2024-09-24
Payer: COMMERCIAL

## 2024-09-24 ENCOUNTER — HOSPITAL ENCOUNTER (OUTPATIENT)
Facility: HOSPITAL | Age: 39
Setting detail: OUTPATIENT SURGERY
Discharge: HOME | End: 2024-09-24
Attending: STUDENT IN AN ORGANIZED HEALTH CARE EDUCATION/TRAINING PROGRAM | Admitting: STUDENT IN AN ORGANIZED HEALTH CARE EDUCATION/TRAINING PROGRAM
Payer: COMMERCIAL

## 2024-09-24 VITALS
HEIGHT: 66 IN | WEIGHT: 253.97 LBS | BODY MASS INDEX: 40.82 KG/M2 | OXYGEN SATURATION: 99 % | HEART RATE: 81 BPM | DIASTOLIC BLOOD PRESSURE: 100 MMHG | SYSTOLIC BLOOD PRESSURE: 146 MMHG | TEMPERATURE: 97.7 F | RESPIRATION RATE: 13 BRPM

## 2024-09-24 DIAGNOSIS — N20.0 KIDNEY STONE: Primary | ICD-10-CM

## 2024-09-24 LAB
GLUCOSE BLD STRIP.AUTO-MCNC: 140 MG/DL (ref 65–117)
HCG UR QL: NEGATIVE
SERVICE CMNT-IMP: ABNORMAL

## 2024-09-24 PROCEDURE — 81025 URINE PREGNANCY TEST: CPT

## 2024-09-24 PROCEDURE — 2580000003 HC RX 258

## 2024-09-24 PROCEDURE — 6370000000 HC RX 637 (ALT 250 FOR IP): Performed by: ANESTHESIOLOGY

## 2024-09-24 PROCEDURE — 2500000003 HC RX 250 WO HCPCS: Performed by: NURSE ANESTHETIST, CERTIFIED REGISTERED

## 2024-09-24 PROCEDURE — C2617 STENT, NON-COR, TEM W/O DEL: HCPCS | Performed by: STUDENT IN AN ORGANIZED HEALTH CARE EDUCATION/TRAINING PROGRAM

## 2024-09-24 PROCEDURE — 2580000003 HC RX 258: Performed by: STUDENT IN AN ORGANIZED HEALTH CARE EDUCATION/TRAINING PROGRAM

## 2024-09-24 PROCEDURE — 7100000001 HC PACU RECOVERY - ADDTL 15 MIN: Performed by: STUDENT IN AN ORGANIZED HEALTH CARE EDUCATION/TRAINING PROGRAM

## 2024-09-24 PROCEDURE — 3700000000 HC ANESTHESIA ATTENDED CARE: Performed by: STUDENT IN AN ORGANIZED HEALTH CARE EDUCATION/TRAINING PROGRAM

## 2024-09-24 PROCEDURE — 2580000003 HC RX 258: Performed by: ANESTHESIOLOGY

## 2024-09-24 PROCEDURE — 3700000001 HC ADD 15 MINUTES (ANESTHESIA): Performed by: STUDENT IN AN ORGANIZED HEALTH CARE EDUCATION/TRAINING PROGRAM

## 2024-09-24 PROCEDURE — C1769 GUIDE WIRE: HCPCS | Performed by: STUDENT IN AN ORGANIZED HEALTH CARE EDUCATION/TRAINING PROGRAM

## 2024-09-24 PROCEDURE — 6360000004 HC RX CONTRAST MEDICATION: Performed by: STUDENT IN AN ORGANIZED HEALTH CARE EDUCATION/TRAINING PROGRAM

## 2024-09-24 PROCEDURE — 3600000004 HC SURGERY LEVEL 4 BASE: Performed by: STUDENT IN AN ORGANIZED HEALTH CARE EDUCATION/TRAINING PROGRAM

## 2024-09-24 PROCEDURE — 7100000000 HC PACU RECOVERY - FIRST 15 MIN: Performed by: STUDENT IN AN ORGANIZED HEALTH CARE EDUCATION/TRAINING PROGRAM

## 2024-09-24 PROCEDURE — 7100000011 HC PHASE II RECOVERY - ADDTL 15 MIN: Performed by: STUDENT IN AN ORGANIZED HEALTH CARE EDUCATION/TRAINING PROGRAM

## 2024-09-24 PROCEDURE — 3600000014 HC SURGERY LEVEL 4 ADDTL 15MIN: Performed by: STUDENT IN AN ORGANIZED HEALTH CARE EDUCATION/TRAINING PROGRAM

## 2024-09-24 PROCEDURE — 2720000010 HC SURG SUPPLY STERILE: Performed by: STUDENT IN AN ORGANIZED HEALTH CARE EDUCATION/TRAINING PROGRAM

## 2024-09-24 PROCEDURE — 6360000002 HC RX W HCPCS

## 2024-09-24 PROCEDURE — C1894 INTRO/SHEATH, NON-LASER: HCPCS | Performed by: STUDENT IN AN ORGANIZED HEALTH CARE EDUCATION/TRAINING PROGRAM

## 2024-09-24 PROCEDURE — 2709999900 HC NON-CHARGEABLE SUPPLY: Performed by: STUDENT IN AN ORGANIZED HEALTH CARE EDUCATION/TRAINING PROGRAM

## 2024-09-24 PROCEDURE — 82962 GLUCOSE BLOOD TEST: CPT

## 2024-09-24 PROCEDURE — 6360000002 HC RX W HCPCS: Performed by: STUDENT IN AN ORGANIZED HEALTH CARE EDUCATION/TRAINING PROGRAM

## 2024-09-24 PROCEDURE — 7100000010 HC PHASE II RECOVERY - FIRST 15 MIN: Performed by: STUDENT IN AN ORGANIZED HEALTH CARE EDUCATION/TRAINING PROGRAM

## 2024-09-24 PROCEDURE — 6360000002 HC RX W HCPCS: Performed by: NURSE ANESTHETIST, CERTIFIED REGISTERED

## 2024-09-24 DEVICE — URETERAL STENT
Type: IMPLANTABLE DEVICE | Site: URETHRA | Status: FUNCTIONAL
Brand: CONTOUR™

## 2024-09-24 RX ORDER — HYDROCODONE BITARTRATE AND ACETAMINOPHEN 5; 325 MG/1; MG/1
1 TABLET ORAL EVERY 6 HOURS PRN
Qty: 12 TABLET | Refills: 0 | Status: SHIPPED | OUTPATIENT
Start: 2024-09-24 | End: 2024-09-27

## 2024-09-24 RX ORDER — OXYCODONE HYDROCHLORIDE 5 MG/1
5 TABLET ORAL
Status: DISCONTINUED | OUTPATIENT
Start: 2024-09-24 | End: 2024-09-24 | Stop reason: HOSPADM

## 2024-09-24 RX ORDER — MIDAZOLAM HYDROCHLORIDE 2 MG/2ML
2 INJECTION, SOLUTION INTRAMUSCULAR; INTRAVENOUS PRN
Status: DISCONTINUED | OUTPATIENT
Start: 2024-09-24 | End: 2024-09-24 | Stop reason: HOSPADM

## 2024-09-24 RX ORDER — FENTANYL CITRATE 50 UG/ML
INJECTION, SOLUTION INTRAMUSCULAR; INTRAVENOUS
Status: DISCONTINUED | OUTPATIENT
Start: 2024-09-24 | End: 2024-09-24 | Stop reason: SDUPTHER

## 2024-09-24 RX ORDER — SODIUM CHLORIDE 0.9 % (FLUSH) 0.9 %
5-40 SYRINGE (ML) INJECTION EVERY 12 HOURS SCHEDULED
Status: DISCONTINUED | OUTPATIENT
Start: 2024-09-24 | End: 2024-09-24 | Stop reason: HOSPADM

## 2024-09-24 RX ORDER — FENTANYL CITRATE 50 UG/ML
100 INJECTION, SOLUTION INTRAMUSCULAR; INTRAVENOUS
Status: DISCONTINUED | OUTPATIENT
Start: 2024-09-24 | End: 2024-09-24 | Stop reason: HOSPADM

## 2024-09-24 RX ORDER — SODIUM CHLORIDE, SODIUM LACTATE, POTASSIUM CHLORIDE, CALCIUM CHLORIDE 600; 310; 30; 20 MG/100ML; MG/100ML; MG/100ML; MG/100ML
INJECTION, SOLUTION INTRAVENOUS CONTINUOUS
Status: DISCONTINUED | OUTPATIENT
Start: 2024-09-24 | End: 2024-09-24 | Stop reason: HOSPADM

## 2024-09-24 RX ORDER — IOPAMIDOL 755 MG/ML
INJECTION, SOLUTION INTRAVASCULAR PRN
Status: DISCONTINUED | OUTPATIENT
Start: 2024-09-24 | End: 2024-09-24 | Stop reason: HOSPADM

## 2024-09-24 RX ORDER — NALOXONE HYDROCHLORIDE 0.4 MG/ML
INJECTION, SOLUTION INTRAMUSCULAR; INTRAVENOUS; SUBCUTANEOUS PRN
Status: DISCONTINUED | OUTPATIENT
Start: 2024-09-24 | End: 2024-09-24 | Stop reason: HOSPADM

## 2024-09-24 RX ORDER — FENTANYL CITRATE 50 UG/ML
25 INJECTION, SOLUTION INTRAMUSCULAR; INTRAVENOUS EVERY 5 MIN PRN
Status: DISCONTINUED | OUTPATIENT
Start: 2024-09-24 | End: 2024-09-24 | Stop reason: HOSPADM

## 2024-09-24 RX ORDER — ONDANSETRON 2 MG/ML
INJECTION INTRAMUSCULAR; INTRAVENOUS
Status: DISCONTINUED | OUTPATIENT
Start: 2024-09-24 | End: 2024-09-24 | Stop reason: SDUPTHER

## 2024-09-24 RX ORDER — PHENYLEPHRINE HCL IN 0.9% NACL 0.4MG/10ML
SYRINGE (ML) INTRAVENOUS
Status: DISCONTINUED | OUTPATIENT
Start: 2024-09-24 | End: 2024-09-24 | Stop reason: SDUPTHER

## 2024-09-24 RX ORDER — SODIUM CHLORIDE 0.9 % (FLUSH) 0.9 %
5-40 SYRINGE (ML) INJECTION PRN
Status: DISCONTINUED | OUTPATIENT
Start: 2024-09-24 | End: 2024-09-24 | Stop reason: HOSPADM

## 2024-09-24 RX ORDER — ACETAMINOPHEN 500 MG
1000 TABLET ORAL ONCE
Status: COMPLETED | OUTPATIENT
Start: 2024-09-24 | End: 2024-09-24

## 2024-09-24 RX ORDER — HYDRALAZINE HYDROCHLORIDE 20 MG/ML
10 INJECTION INTRAMUSCULAR; INTRAVENOUS
Status: DISCONTINUED | OUTPATIENT
Start: 2024-09-24 | End: 2024-09-24 | Stop reason: HOSPADM

## 2024-09-24 RX ORDER — LIDOCAINE HYDROCHLORIDE 20 MG/ML
INJECTION, SOLUTION EPIDURAL; INFILTRATION; INTRACAUDAL; PERINEURAL
Status: DISCONTINUED | OUTPATIENT
Start: 2024-09-24 | End: 2024-09-24 | Stop reason: SDUPTHER

## 2024-09-24 RX ORDER — LIDOCAINE HYDROCHLORIDE 10 MG/ML
1 INJECTION, SOLUTION EPIDURAL; INFILTRATION; INTRACAUDAL; PERINEURAL
Status: DISCONTINUED | OUTPATIENT
Start: 2024-09-24 | End: 2024-09-24 | Stop reason: HOSPADM

## 2024-09-24 RX ORDER — MIDAZOLAM HYDROCHLORIDE 1 MG/ML
INJECTION INTRAMUSCULAR; INTRAVENOUS
Status: DISCONTINUED | OUTPATIENT
Start: 2024-09-24 | End: 2024-09-24 | Stop reason: SDUPTHER

## 2024-09-24 RX ORDER — PROPOFOL 10 MG/ML
INJECTION, EMULSION INTRAVENOUS
Status: DISCONTINUED | OUTPATIENT
Start: 2024-09-24 | End: 2024-09-24 | Stop reason: SDUPTHER

## 2024-09-24 RX ORDER — SODIUM CHLORIDE 9 MG/ML
INJECTION, SOLUTION INTRAVENOUS PRN
Status: DISCONTINUED | OUTPATIENT
Start: 2024-09-24 | End: 2024-09-24 | Stop reason: HOSPADM

## 2024-09-24 RX ORDER — ONDANSETRON 2 MG/ML
4 INJECTION INTRAMUSCULAR; INTRAVENOUS
Status: DISCONTINUED | OUTPATIENT
Start: 2024-09-24 | End: 2024-09-24 | Stop reason: HOSPADM

## 2024-09-24 RX ORDER — PROCHLORPERAZINE EDISYLATE 5 MG/ML
5 INJECTION INTRAMUSCULAR; INTRAVENOUS
Status: DISCONTINUED | OUTPATIENT
Start: 2024-09-24 | End: 2024-09-24 | Stop reason: HOSPADM

## 2024-09-24 RX ORDER — DEXAMETHASONE SODIUM PHOSPHATE 4 MG/ML
INJECTION, SOLUTION INTRA-ARTICULAR; INTRALESIONAL; INTRAMUSCULAR; INTRAVENOUS; SOFT TISSUE
Status: DISCONTINUED | OUTPATIENT
Start: 2024-09-24 | End: 2024-09-24 | Stop reason: SDUPTHER

## 2024-09-24 RX ADMIN — PROPOFOL 50 MG: 10 INJECTION, EMULSION INTRAVENOUS at 14:56

## 2024-09-24 RX ADMIN — MIDAZOLAM HYDROCHLORIDE 2 MG: 1 INJECTION, SOLUTION INTRAMUSCULAR; INTRAVENOUS at 14:50

## 2024-09-24 RX ADMIN — Medication 80 MCG: at 15:06

## 2024-09-24 RX ADMIN — WATER 2000 MG: 1 INJECTION INTRAMUSCULAR; INTRAVENOUS; SUBCUTANEOUS at 15:05

## 2024-09-24 RX ADMIN — DEXAMETHASONE SODIUM PHOSPHATE 8 MG: 4 INJECTION INTRA-ARTICULAR; INTRALESIONAL; INTRAMUSCULAR; INTRAVENOUS; SOFT TISSUE at 15:05

## 2024-09-24 RX ADMIN — SODIUM CHLORIDE, POTASSIUM CHLORIDE, SODIUM LACTATE AND CALCIUM CHLORIDE: 600; 310; 30; 20 INJECTION, SOLUTION INTRAVENOUS at 17:59

## 2024-09-24 RX ADMIN — LIDOCAINE HYDROCHLORIDE 100 MG: 20 INJECTION, SOLUTION EPIDURAL; INFILTRATION; INTRACAUDAL; PERINEURAL at 14:55

## 2024-09-24 RX ADMIN — Medication 120 MCG: at 15:24

## 2024-09-24 RX ADMIN — Medication 80 MCG: at 15:20

## 2024-09-24 RX ADMIN — PHENYLEPHRINE HYDROCHLORIDE 40 MCG/MIN: 10 INJECTION INTRAVENOUS at 15:16

## 2024-09-24 RX ADMIN — FENTANYL CITRATE 50 MCG: 50 INJECTION, SOLUTION INTRAMUSCULAR; INTRAVENOUS at 18:18

## 2024-09-24 RX ADMIN — MIDAZOLAM HYDROCHLORIDE 3 MG: 1 INJECTION, SOLUTION INTRAMUSCULAR; INTRAVENOUS at 14:46

## 2024-09-24 RX ADMIN — PROPOFOL 150 MG: 10 INJECTION, EMULSION INTRAVENOUS at 14:55

## 2024-09-24 RX ADMIN — Medication 40 MCG: at 15:00

## 2024-09-24 RX ADMIN — FENTANYL CITRATE 25 MCG: 50 INJECTION, SOLUTION INTRAMUSCULAR; INTRAVENOUS at 16:47

## 2024-09-24 RX ADMIN — FENTANYL CITRATE 25 MCG: 50 INJECTION, SOLUTION INTRAMUSCULAR; INTRAVENOUS at 15:02

## 2024-09-24 RX ADMIN — Medication 40 MCG: at 15:13

## 2024-09-24 RX ADMIN — FENTANYL CITRATE 25 MCG: 50 INJECTION, SOLUTION INTRAMUSCULAR; INTRAVENOUS at 16:30

## 2024-09-24 RX ADMIN — ACETAMINOPHEN 1000 MG: 500 TABLET ORAL at 14:13

## 2024-09-24 RX ADMIN — FENTANYL CITRATE 25 MCG: 50 INJECTION, SOLUTION INTRAMUSCULAR; INTRAVENOUS at 15:17

## 2024-09-24 RX ADMIN — Medication 80 MCG: at 15:17

## 2024-09-24 RX ADMIN — SODIUM CHLORIDE, POTASSIUM CHLORIDE, SODIUM LACTATE AND CALCIUM CHLORIDE: 600; 310; 30; 20 INJECTION, SOLUTION INTRAVENOUS at 14:06

## 2024-09-24 RX ADMIN — FENTANYL CITRATE 50 MCG: 50 INJECTION, SOLUTION INTRAMUSCULAR; INTRAVENOUS at 17:43

## 2024-09-24 RX ADMIN — ONDANSETRON 4 MG: 2 INJECTION INTRAMUSCULAR; INTRAVENOUS at 15:05

## 2024-09-24 ASSESSMENT — PAIN - FUNCTIONAL ASSESSMENT: PAIN_FUNCTIONAL_ASSESSMENT: 0-10

## 2024-09-25 LAB
COLOR STONE: NORMAL
LABORATORY COMMENT REPORT: NORMAL
LABORATORY COMMENT REPORT: NORMAL
Lab: NORMAL
Lab: NORMAL
PHOTO: NORMAL
SIZE STONE: <1 MM
SPECIMEN SOURCE: NORMAL
STONE COMPOSITION: NORMAL
URATE MFR STONE: 100 %
WT STONE: <1 MG

## 2024-10-03 ENCOUNTER — APPOINTMENT (OUTPATIENT)
Facility: HOSPITAL | Age: 39
End: 2024-10-03
Payer: COMMERCIAL

## 2024-10-03 ENCOUNTER — HOSPITAL ENCOUNTER (EMERGENCY)
Facility: HOSPITAL | Age: 39
Discharge: HOME | End: 2024-10-03
Attending: EMERGENCY MEDICINE
Payer: COMMERCIAL

## 2024-10-03 VITALS
SYSTOLIC BLOOD PRESSURE: 157 MMHG | HEIGHT: 66 IN | HEART RATE: 94 BPM | DIASTOLIC BLOOD PRESSURE: 95 MMHG | RESPIRATION RATE: 17 BRPM | OXYGEN SATURATION: 100 % | WEIGHT: 254 LBS | BODY MASS INDEX: 40.82 KG/M2 | TEMPERATURE: 98.5 F

## 2024-10-03 DIAGNOSIS — R10.9 ACUTE LEFT FLANK PAIN: Primary | ICD-10-CM

## 2024-10-03 LAB
ALBUMIN SERPL-MCNC: 2.4 G/DL (ref 3.5–5)
ALBUMIN/GLOB SERPL: 0.4 (ref 1.1–2.2)
ALP SERPL-CCNC: 168 U/L (ref 45–117)
ALT SERPL-CCNC: 16 U/L (ref 12–78)
ANION GAP SERPL CALC-SCNC: 7 MMOL/L (ref 2–12)
AST SERPL-CCNC: 6 U/L (ref 15–37)
BASOPHILS # BLD: 0 K/UL (ref 0–0.1)
BASOPHILS NFR BLD: 0 % (ref 0–1)
BILIRUB SERPL-MCNC: 0.3 MG/DL (ref 0.2–1)
BUN SERPL-MCNC: 21 MG/DL (ref 6–20)
BUN/CREAT SERPL: 12 (ref 12–20)
CALCIUM SERPL-MCNC: 9.3 MG/DL (ref 8.5–10.1)
CHLORIDE SERPL-SCNC: 105 MMOL/L (ref 97–108)
CO2 SERPL-SCNC: 21 MMOL/L (ref 21–32)
CREAT SERPL-MCNC: 1.71 MG/DL (ref 0.55–1.02)
DIFFERENTIAL METHOD BLD: ABNORMAL
EOSINOPHIL # BLD: 0.1 K/UL (ref 0–0.4)
EOSINOPHIL NFR BLD: 2 % (ref 0–7)
ERYTHROCYTE [DISTWIDTH] IN BLOOD BY AUTOMATED COUNT: 15.8 % (ref 11.5–14.5)
GLOBULIN SER CALC-MCNC: 5.5 G/DL (ref 2–4)
GLUCOSE SERPL-MCNC: 313 MG/DL (ref 65–100)
HCG SERPL QL: NEGATIVE
HCT VFR BLD AUTO: 32.9 % (ref 35–47)
HGB BLD-MCNC: 10 G/DL (ref 11.5–16)
IMM GRANULOCYTES # BLD AUTO: 0 K/UL (ref 0–0.04)
IMM GRANULOCYTES NFR BLD AUTO: 1 % (ref 0–0.5)
LIPASE SERPL-CCNC: 52 U/L (ref 13–75)
LYMPHOCYTES # BLD: 0.9 K/UL (ref 0.8–3.5)
LYMPHOCYTES NFR BLD: 16 % (ref 12–49)
MCH RBC QN AUTO: 24.4 PG (ref 26–34)
MCHC RBC AUTO-ENTMCNC: 30.4 G/DL (ref 30–36.5)
MCV RBC AUTO: 80.4 FL (ref 80–99)
MONOCYTES # BLD: 0.5 K/UL (ref 0–1)
MONOCYTES NFR BLD: 8 % (ref 5–13)
NEUTS SEG # BLD: 4.3 K/UL (ref 1.8–8)
NEUTS SEG NFR BLD: 73 % (ref 32–75)
NRBC # BLD: 0 K/UL (ref 0–0.01)
NRBC BLD-RTO: 0 PER 100 WBC
PLATELET # BLD AUTO: 348 K/UL (ref 150–400)
PMV BLD AUTO: 10.6 FL (ref 8.9–12.9)
POTASSIUM SERPL-SCNC: 4 MMOL/L (ref 3.5–5.1)
PROT SERPL-MCNC: 7.9 G/DL (ref 6.4–8.2)
RBC # BLD AUTO: 4.09 M/UL (ref 3.8–5.2)
SODIUM SERPL-SCNC: 133 MMOL/L (ref 136–145)
WBC # BLD AUTO: 5.9 K/UL (ref 3.6–11)

## 2024-10-03 PROCEDURE — 85025 COMPLETE CBC W/AUTO DIFF WBC: CPT

## 2024-10-03 PROCEDURE — 96374 THER/PROPH/DIAG INJ IV PUSH: CPT

## 2024-10-03 PROCEDURE — 83690 ASSAY OF LIPASE: CPT

## 2024-10-03 PROCEDURE — 2580000003 HC RX 258: Performed by: EMERGENCY MEDICINE

## 2024-10-03 PROCEDURE — 6360000002 HC RX W HCPCS: Performed by: EMERGENCY MEDICINE

## 2024-10-03 PROCEDURE — 36415 COLL VENOUS BLD VENIPUNCTURE: CPT

## 2024-10-03 PROCEDURE — 80053 COMPREHEN METABOLIC PANEL: CPT

## 2024-10-03 PROCEDURE — 99284 EMERGENCY DEPT VISIT MOD MDM: CPT

## 2024-10-03 PROCEDURE — 6370000000 HC RX 637 (ALT 250 FOR IP): Performed by: EMERGENCY MEDICINE

## 2024-10-03 PROCEDURE — 84703 CHORIONIC GONADOTROPIN ASSAY: CPT

## 2024-10-03 PROCEDURE — 74176 CT ABD & PELVIS W/O CONTRAST: CPT

## 2024-10-03 PROCEDURE — 96375 TX/PRO/DX INJ NEW DRUG ADDON: CPT

## 2024-10-03 RX ORDER — ONDANSETRON 2 MG/ML
4 INJECTION INTRAMUSCULAR; INTRAVENOUS ONCE
Status: COMPLETED | OUTPATIENT
Start: 2024-10-03 | End: 2024-10-03

## 2024-10-03 RX ORDER — OXYCODONE AND ACETAMINOPHEN 5; 325 MG/1; MG/1
1 TABLET ORAL EVERY 6 HOURS PRN
Qty: 12 TABLET | Refills: 0 | Status: SHIPPED | OUTPATIENT
Start: 2024-10-03 | End: 2024-10-06

## 2024-10-03 RX ORDER — 0.9 % SODIUM CHLORIDE 0.9 %
1000 INTRAVENOUS SOLUTION INTRAVENOUS ONCE
Status: COMPLETED | OUTPATIENT
Start: 2024-10-03 | End: 2024-10-03

## 2024-10-03 RX ORDER — KETOROLAC TROMETHAMINE 30 MG/ML
15 INJECTION, SOLUTION INTRAMUSCULAR; INTRAVENOUS
Status: COMPLETED | OUTPATIENT
Start: 2024-10-03 | End: 2024-10-03

## 2024-10-03 RX ORDER — OXYCODONE HYDROCHLORIDE 5 MG/1
5 TABLET ORAL
Status: COMPLETED | OUTPATIENT
Start: 2024-10-03 | End: 2024-10-03

## 2024-10-03 RX ADMIN — KETOROLAC TROMETHAMINE 15 MG: 30 INJECTION, SOLUTION INTRAMUSCULAR at 05:12

## 2024-10-03 RX ADMIN — ONDANSETRON 4 MG: 2 INJECTION INTRAMUSCULAR; INTRAVENOUS at 05:12

## 2024-10-03 RX ADMIN — OXYCODONE 5 MG: 5 TABLET ORAL at 09:34

## 2024-10-03 RX ADMIN — SODIUM CHLORIDE 1000 ML: 9 INJECTION, SOLUTION INTRAVENOUS at 05:13

## 2024-10-03 ASSESSMENT — PAIN DESCRIPTION - DESCRIPTORS: DESCRIPTORS: ACHING

## 2024-10-03 ASSESSMENT — LIFESTYLE VARIABLES
HOW MANY STANDARD DRINKS CONTAINING ALCOHOL DO YOU HAVE ON A TYPICAL DAY: PATIENT DOES NOT DRINK
HOW OFTEN DO YOU HAVE A DRINK CONTAINING ALCOHOL: NEVER

## 2024-10-03 ASSESSMENT — PAIN DESCRIPTION - LOCATION
LOCATION: ABDOMEN;FLANK
LOCATION: ABDOMEN;FLANK
LOCATION: ABDOMEN

## 2024-10-03 ASSESSMENT — ENCOUNTER SYMPTOMS
VOMITING: 0
NAUSEA: 0
SHORTNESS OF BREATH: 0
ABDOMINAL PAIN: 0
DIARRHEA: 0

## 2024-10-03 ASSESSMENT — PAIN DESCRIPTION - ORIENTATION
ORIENTATION: LEFT

## 2024-10-03 ASSESSMENT — PAIN SCALES - GENERAL
PAINLEVEL_OUTOF10: 3
PAINLEVEL_OUTOF10: 5
PAINLEVEL_OUTOF10: 7

## 2024-10-03 ASSESSMENT — PAIN - FUNCTIONAL ASSESSMENT: PAIN_FUNCTIONAL_ASSESSMENT: 0-10

## 2024-10-03 NOTE — ED NOTES
Assumed care of patient at this time, report received from ARCENIO Garcias. Patient in CT at this time.

## 2024-10-03 NOTE — ED NOTES
Back from CT at this time. Bed locked and in low position, call bell within reach, on monitor x3.

## 2024-10-03 NOTE — ED PROVIDER NOTES
EMERGENCY DEPARTMENT HISTORY AND PHYSICAL EXAM     ----------------------------------------------------------------------------  Please note that this dictation was completed with Quick[employer], the [employer] voice recognition software.  Quite often unanticipated grammatical, syntax, homophones, and other interpretive errors are inadvertently transcribed by the computer software.  Please disregard these errors.  Please excuse any errors that have escaped final proofreading  ----------------------------------------------------------------------------      Date: 10/3/2024  Patient Name: Kerrie Soriano      HISTORY OF PRESENT ILLNESS     Chief Complaint   Patient presents with    Abdominal Pain     Pt arrives ambulatory to  for Left sided abdominal pain x1 week. Pt advises she has a kidney stone removed on 9/24 with stent placement, but had surgery to remove the stent yesterday. +N/V  Ibuprofen PTA w./ relief.        History obtainted from:  Patient    Other independent source of history: none    HPI: Kerrie Soriano is a 39 y.o. female, with significant pmhx of diabetes, hypertension, recent staghorn calculi requiring urologic intervention, who presents via private vehicle  to the ED with c/o left-sided flank pain that has been ongoing since removal of her stent last week.  Notes that she follows with [facility] for recent staghorn calculus that required intervention.  Notes that she nausea and vomiting and pain not relieved with ibuprofen.  Denies fever, chest pain or shortness of breath.  Denies dysuria or frequency.      PCP: No primary care provider on file.    Allergy List:   Allergies   Allergen Reactions    Latex Rash     Reaction Type: Allergy    Cephalexin      Other reaction(s): Unknown (comments)    Fluconazole Swelling         CURRENT MEDICATIONS      Previous Medications    SEMAGLUTIDE, 1 MG/DOSE, (OZEMPIC, 1 MG/DOSE,) 4 MG/3ML SOPN SC INJECTION    Inject 1 mg into the skin every 7 days ON MONDAY

## 2024-10-03 NOTE — ED NOTES
Patient refusing to provide urine sample, stating \"I have not had sex in 15 months so I am not pregnant and I don't have a UTI.\" Patient requesting breakfast tray. MD Issa notified. Per MD, to go see patient.

## 2024-10-03 NOTE — ED NOTES
Bedside shift change report given to Debra RN (oncoming nurse) by Katerine RN (offgoing nurse). Report included the following information Nurse Handoff Report, Index, ED Encounter Summary, ED SBAR, Surgery Report, Intake/Output, MAR, Recent Results, and Cardiac Rhythm SINUS TACH .

## (undated) DEVICE — SYRINGE MED 10ML LUERLOCK TIP W/O SFTY DISP

## (undated) DEVICE — SOLUTION IRRIG 3000ML 0.9% SOD CHL USP UROMATIC PLAS CONT

## (undated) DEVICE — FIBER LSR FLEXIVA PULSE 242

## (undated) DEVICE — URETERAL ACCESS SHEATH SET: Brand: NAVIGATOR HD

## (undated) DEVICE — GARMENT,MEDLINE,DVT,INT,CALF,MED, GEN2: Brand: MEDLINE

## (undated) DEVICE — GLOVE ORANGE PI 7 1/2   MSG9075

## (undated) DEVICE — SOLUTION IRRIGATION STRL H2O 1000 ML UROMATIC CONTAINER

## (undated) DEVICE — SOLUTION IRRIG 1000ML STRL H2O USP PLAS POUR BTL

## (undated) DEVICE — SENSOR-URETERORENOSCOPE 9FR WL 600MM  FLEXIBLE, WORKING CHANNEL 1:  3,6FR, WORKING CHANNEL 2: 1,65FR, PU= 3 PCS, STERILE, FOR SINGLE USE: Brand: RIWO D-URS

## (undated) DEVICE — GUIDEWIRE ENDOSCP L150CM DIA0.035IN TIP 3CM PTFE NIT

## (undated) DEVICE — Y-TYPE TUR/BLADDER IRRIGATION SET, REGULATING CLAMP

## (undated) DEVICE — TOWEL SURG W17XL27IN STD BLU COT NONFENESTRATED PREWASHED

## (undated) DEVICE — CYSTO-SMH: Brand: MEDLINE INDUSTRIES, INC.

## (undated) DEVICE — FIBER LASER RENTAL OPTICA XT THULIUM

## (undated) DEVICE — Device

## (undated) DEVICE — GUIDEWIRE URO L145CM DIA0.035IN TIP L3.5CM PTFE FLX AMPLATZ

## (undated) DEVICE — FLEXOR, URETERAL ACCESS SHEATH WITH AQ, HYDROPHILIC COATING: Brand: FLEXOR

## (undated) DEVICE — 200 MICRON SINGLE USE FIBER ASSEMBLY WITH FLAT TIP